# Patient Record
Sex: FEMALE | Race: WHITE | NOT HISPANIC OR LATINO
[De-identification: names, ages, dates, MRNs, and addresses within clinical notes are randomized per-mention and may not be internally consistent; named-entity substitution may affect disease eponyms.]

---

## 2017-03-20 ENCOUNTER — APPOINTMENT (OUTPATIENT)
Dept: OBGYN | Facility: CLINIC | Age: 57
End: 2017-03-20

## 2017-03-20 VITALS
HEIGHT: 68 IN | DIASTOLIC BLOOD PRESSURE: 80 MMHG | BODY MASS INDEX: 32.28 KG/M2 | SYSTOLIC BLOOD PRESSURE: 120 MMHG | WEIGHT: 213 LBS

## 2017-03-20 DIAGNOSIS — Z01.419 ENCOUNTER FOR GYNECOLOGICAL EXAMINATION (GENERAL) (ROUTINE) W/OUT ABNORMAL FINDINGS: ICD-10-CM

## 2017-03-20 DIAGNOSIS — Z80.0 FAMILY HISTORY OF MALIGNANT NEOPLASM OF DIGESTIVE ORGANS: ICD-10-CM

## 2017-03-20 DIAGNOSIS — N93.0 POSTCOITAL AND CONTACT BLEEDING: ICD-10-CM

## 2017-03-23 LAB — CYTOLOGY CVX/VAG DOC THIN PREP: NORMAL

## 2017-05-08 ENCOUNTER — APPOINTMENT (OUTPATIENT)
Dept: ULTRASOUND IMAGING | Facility: CLINIC | Age: 57
End: 2017-05-08

## 2017-05-08 ENCOUNTER — APPOINTMENT (OUTPATIENT)
Dept: RADIOLOGY | Facility: CLINIC | Age: 57
End: 2017-05-08

## 2017-05-08 ENCOUNTER — APPOINTMENT (OUTPATIENT)
Dept: MAMMOGRAPHY | Facility: CLINIC | Age: 57
End: 2017-05-08

## 2017-05-08 ENCOUNTER — OUTPATIENT (OUTPATIENT)
Dept: OUTPATIENT SERVICES | Facility: HOSPITAL | Age: 57
LOS: 1 days | End: 2017-05-08
Payer: COMMERCIAL

## 2017-05-08 DIAGNOSIS — Z00.8 ENCOUNTER FOR OTHER GENERAL EXAMINATION: ICD-10-CM

## 2017-05-08 PROCEDURE — 77063 BREAST TOMOSYNTHESIS BI: CPT

## 2017-05-08 PROCEDURE — 76641 ULTRASOUND BREAST COMPLETE: CPT

## 2017-05-08 PROCEDURE — 77080 DXA BONE DENSITY AXIAL: CPT

## 2017-05-08 PROCEDURE — 76830 TRANSVAGINAL US NON-OB: CPT

## 2017-05-08 PROCEDURE — 77067 SCR MAMMO BI INCL CAD: CPT

## 2017-05-08 PROCEDURE — 76856 US EXAM PELVIC COMPLETE: CPT

## 2017-06-09 ENCOUNTER — APPOINTMENT (OUTPATIENT)
Dept: UROLOGY | Facility: CLINIC | Age: 57
End: 2017-06-09

## 2017-06-09 VITALS
SYSTOLIC BLOOD PRESSURE: 151 MMHG | DIASTOLIC BLOOD PRESSURE: 84 MMHG | HEART RATE: 90 BPM | RESPIRATION RATE: 16 BRPM | TEMPERATURE: 97.7 F

## 2017-06-14 LAB
BACTERIA UR CULT: ABNORMAL
BILIRUB UR QL STRIP: NORMAL
GLUCOSE UR-MCNC: NORMAL
HCG UR QL: 0.2 EU/DL
HGB UR QL STRIP.AUTO: NORMAL
KETONES UR-MCNC: NORMAL
LEUKOCYTE ESTERASE UR QL STRIP: NORMAL
NITRITE UR QL STRIP: NORMAL
PH UR STRIP: 6
PROT UR STRIP-MCNC: NORMAL
SP GR UR STRIP: 1.02

## 2017-06-23 ENCOUNTER — RESULT REVIEW (OUTPATIENT)
Age: 57
End: 2017-06-23

## 2017-06-29 ENCOUNTER — FORM ENCOUNTER (OUTPATIENT)
Age: 57
End: 2017-06-29

## 2017-06-30 ENCOUNTER — APPOINTMENT (OUTPATIENT)
Dept: UROLOGY | Facility: CLINIC | Age: 57
End: 2017-06-30

## 2017-06-30 ENCOUNTER — APPOINTMENT (OUTPATIENT)
Dept: CT IMAGING | Facility: IMAGING CENTER | Age: 57
End: 2017-06-30

## 2017-06-30 ENCOUNTER — OUTPATIENT (OUTPATIENT)
Dept: OUTPATIENT SERVICES | Facility: HOSPITAL | Age: 57
LOS: 1 days | End: 2017-06-30
Payer: COMMERCIAL

## 2017-06-30 VITALS
SYSTOLIC BLOOD PRESSURE: 169 MMHG | DIASTOLIC BLOOD PRESSURE: 92 MMHG | TEMPERATURE: 98 F | WEIGHT: 215 LBS | BODY MASS INDEX: 32.58 KG/M2 | RESPIRATION RATE: 16 BRPM | HEIGHT: 68 IN | HEART RATE: 88 BPM

## 2017-06-30 DIAGNOSIS — R35.0 FREQUENCY OF MICTURITION: ICD-10-CM

## 2017-06-30 DIAGNOSIS — R31.0 GROSS HEMATURIA: ICD-10-CM

## 2017-06-30 PROCEDURE — 52000 CYSTOURETHROSCOPY: CPT

## 2017-06-30 PROCEDURE — 74178 CT ABD&PLV WO CNTR FLWD CNTR: CPT

## 2017-07-03 DIAGNOSIS — R31.0 GROSS HEMATURIA: ICD-10-CM

## 2017-07-06 LAB
ANION GAP SERPL CALC-SCNC: 17 MMOL/L
BUN SERPL-MCNC: 15 MG/DL
CALCIUM SERPL-MCNC: 9.6 MG/DL
CHLORIDE SERPL-SCNC: 102 MMOL/L
CO2 SERPL-SCNC: 24 MMOL/L
CORE LAB FLUID CYTOLOGY: NORMAL
CREAT SERPL-MCNC: 0.86 MG/DL
GLUCOSE SERPL-MCNC: 93 MG/DL
POTASSIUM SERPL-SCNC: 4.2 MMOL/L
SODIUM SERPL-SCNC: 143 MMOL/L

## 2017-12-22 ENCOUNTER — APPOINTMENT (OUTPATIENT)
Dept: UROLOGY | Facility: CLINIC | Age: 57
End: 2017-12-22
Payer: COMMERCIAL

## 2017-12-22 VITALS
TEMPERATURE: 97.7 F | SYSTOLIC BLOOD PRESSURE: 147 MMHG | WEIGHT: 215 LBS | HEIGHT: 68 IN | HEART RATE: 70 BPM | DIASTOLIC BLOOD PRESSURE: 85 MMHG | BODY MASS INDEX: 32.58 KG/M2

## 2017-12-22 DIAGNOSIS — K42.9 UMBILICAL HERNIA W/OUT OBSTRUCTION OR GANGRENE: ICD-10-CM

## 2017-12-22 LAB
APPEARANCE: ABNORMAL
BACTERIA: ABNORMAL
BILIRUBIN URINE: NEGATIVE
BLOOD URINE: NEGATIVE
COLOR: YELLOW
GLUCOSE QUALITATIVE U: NEGATIVE MG/DL
HYALINE CASTS: 3 /LPF
KETONES URINE: NEGATIVE
LEUKOCYTE ESTERASE URINE: NEGATIVE
MICROSCOPIC-UA: NORMAL
NITRITE URINE: NEGATIVE
PH URINE: 7.5
PROTEIN URINE: NEGATIVE MG/DL
RED BLOOD CELLS URINE: 3 /HPF
SPECIFIC GRAVITY URINE: 1.02
SQUAMOUS EPITHELIAL CELLS: 14 /HPF
UROBILINOGEN URINE: NEGATIVE MG/DL
WHITE BLOOD CELLS URINE: 2 /HPF

## 2017-12-22 PROCEDURE — 99213 OFFICE O/P EST LOW 20 MIN: CPT

## 2017-12-29 ENCOUNTER — APPOINTMENT (OUTPATIENT)
Dept: UROLOGY | Facility: CLINIC | Age: 57
End: 2017-12-29

## 2018-01-19 ENCOUNTER — OUTPATIENT (OUTPATIENT)
Dept: OUTPATIENT SERVICES | Facility: HOSPITAL | Age: 58
LOS: 1 days | End: 2018-01-19
Payer: COMMERCIAL

## 2018-01-19 VITALS
SYSTOLIC BLOOD PRESSURE: 130 MMHG | TEMPERATURE: 98 F | RESPIRATION RATE: 16 BRPM | WEIGHT: 205.03 LBS | HEART RATE: 69 BPM | HEIGHT: 67 IN | OXYGEN SATURATION: 97 % | DIASTOLIC BLOOD PRESSURE: 88 MMHG

## 2018-01-19 DIAGNOSIS — Z01.818 ENCOUNTER FOR OTHER PREPROCEDURAL EXAMINATION: ICD-10-CM

## 2018-01-19 DIAGNOSIS — Z98.890 OTHER SPECIFIED POSTPROCEDURAL STATES: Chronic | ICD-10-CM

## 2018-01-19 DIAGNOSIS — G47.30 SLEEP APNEA, UNSPECIFIED: ICD-10-CM

## 2018-01-19 DIAGNOSIS — K42.9 UMBILICAL HERNIA WITHOUT OBSTRUCTION OR GANGRENE: ICD-10-CM

## 2018-01-19 LAB
HCT VFR BLD CALC: 42.2 % — SIGNIFICANT CHANGE UP (ref 34.5–45)
HGB BLD-MCNC: 13.6 G/DL — SIGNIFICANT CHANGE UP (ref 11.5–15.5)
MCHC RBC-ENTMCNC: 29.8 PG — SIGNIFICANT CHANGE UP (ref 27–34)
MCHC RBC-ENTMCNC: 32.2 GM/DL — SIGNIFICANT CHANGE UP (ref 32–36)
MCV RBC AUTO: 92.3 FL — SIGNIFICANT CHANGE UP (ref 80–100)
PLATELET # BLD AUTO: 272 K/UL — SIGNIFICANT CHANGE UP (ref 150–400)
RBC # BLD: 4.57 M/UL — SIGNIFICANT CHANGE UP (ref 3.8–5.2)
RBC # FLD: 14.7 % — HIGH (ref 10.3–14.5)
WBC # BLD: 9.02 K/UL — SIGNIFICANT CHANGE UP (ref 3.8–10.5)
WBC # FLD AUTO: 9.02 K/UL — SIGNIFICANT CHANGE UP (ref 3.8–10.5)

## 2018-01-19 PROCEDURE — 85027 COMPLETE CBC AUTOMATED: CPT

## 2018-01-19 PROCEDURE — G0463: CPT

## 2018-01-19 PROCEDURE — 80048 BASIC METABOLIC PNL TOTAL CA: CPT

## 2018-01-19 RX ORDER — LIDOCAINE HCL 20 MG/ML
0.2 VIAL (ML) INJECTION ONCE
Qty: 0 | Refills: 0 | Status: DISCONTINUED | OUTPATIENT
Start: 2018-01-30 | End: 2018-02-14

## 2018-01-19 RX ORDER — SODIUM CHLORIDE 9 MG/ML
3 INJECTION INTRAMUSCULAR; INTRAVENOUS; SUBCUTANEOUS EVERY 8 HOURS
Qty: 0 | Refills: 0 | Status: DISCONTINUED | OUTPATIENT
Start: 2018-01-30 | End: 2018-02-14

## 2018-01-19 RX ORDER — ACETAMINOPHEN 500 MG
1000 TABLET ORAL ONCE
Qty: 0 | Refills: 0 | Status: COMPLETED | OUTPATIENT
Start: 2018-01-30 | End: 2018-01-30

## 2018-01-19 NOTE — H&P PST ADULT - PROBLEM SELECTOR PLAN 1
Umbilical hernia repair  Pepcid x2 pre-op as directed Umbilical hernia repair possible mesh  Pepcid x2 pre-op as directed

## 2018-01-19 NOTE — H&P PST ADULT - HISTORY OF PRESENT ILLNESS
59 y/o f with sleep apnea, uses c-pap, obesity presents pre umbilical hernia repair scheduled for 1/30/18.

## 2018-01-20 LAB
ANION GAP SERPL CALC-SCNC: 12 MMOL/L — SIGNIFICANT CHANGE UP (ref 5–17)
BUN SERPL-MCNC: 23 MG/DL — SIGNIFICANT CHANGE UP (ref 7–23)
CALCIUM SERPL-MCNC: 10.4 MG/DL — SIGNIFICANT CHANGE UP (ref 8.4–10.5)
CHLORIDE SERPL-SCNC: 101 MMOL/L — SIGNIFICANT CHANGE UP (ref 96–108)
CO2 SERPL-SCNC: 29 MMOL/L — SIGNIFICANT CHANGE UP (ref 22–31)
CREAT SERPL-MCNC: 0.88 MG/DL — SIGNIFICANT CHANGE UP (ref 0.5–1.3)
GLUCOSE SERPL-MCNC: 84 MG/DL — SIGNIFICANT CHANGE UP (ref 70–99)
POTASSIUM SERPL-MCNC: 4.7 MMOL/L — SIGNIFICANT CHANGE UP (ref 3.5–5.3)
POTASSIUM SERPL-SCNC: 4.7 MMOL/L — SIGNIFICANT CHANGE UP (ref 3.5–5.3)
SODIUM SERPL-SCNC: 142 MMOL/L — SIGNIFICANT CHANGE UP (ref 135–145)

## 2018-01-30 ENCOUNTER — OUTPATIENT (OUTPATIENT)
Dept: OUTPATIENT SERVICES | Facility: HOSPITAL | Age: 58
LOS: 1 days | End: 2018-01-30
Payer: COMMERCIAL

## 2018-01-30 ENCOUNTER — RESULT REVIEW (OUTPATIENT)
Age: 58
End: 2018-01-30

## 2018-01-30 ENCOUNTER — TRANSCRIPTION ENCOUNTER (OUTPATIENT)
Age: 58
End: 2018-01-30

## 2018-01-30 VITALS
OXYGEN SATURATION: 98 % | TEMPERATURE: 98 F | DIASTOLIC BLOOD PRESSURE: 63 MMHG | RESPIRATION RATE: 20 BRPM | SYSTOLIC BLOOD PRESSURE: 107 MMHG | HEART RATE: 59 BPM

## 2018-01-30 VITALS
DIASTOLIC BLOOD PRESSURE: 78 MMHG | HEIGHT: 67 IN | SYSTOLIC BLOOD PRESSURE: 125 MMHG | RESPIRATION RATE: 18 BRPM | HEART RATE: 63 BPM | TEMPERATURE: 98 F | WEIGHT: 205.03 LBS

## 2018-01-30 DIAGNOSIS — Z98.890 OTHER SPECIFIED POSTPROCEDURAL STATES: Chronic | ICD-10-CM

## 2018-01-30 DIAGNOSIS — K42.9 UMBILICAL HERNIA WITHOUT OBSTRUCTION OR GANGRENE: ICD-10-CM

## 2018-01-30 PROCEDURE — 88302 TISSUE EXAM BY PATHOLOGIST: CPT | Mod: 26

## 2018-01-30 PROCEDURE — 49560: CPT

## 2018-01-30 PROCEDURE — 88302 TISSUE EXAM BY PATHOLOGIST: CPT

## 2018-01-30 RX ORDER — SODIUM CHLORIDE 9 MG/ML
1000 INJECTION, SOLUTION INTRAVENOUS
Qty: 0 | Refills: 0 | Status: DISCONTINUED | OUTPATIENT
Start: 2018-01-30 | End: 2018-02-14

## 2018-01-30 RX ORDER — HYDROMORPHONE HYDROCHLORIDE 2 MG/ML
0.25 INJECTION INTRAMUSCULAR; INTRAVENOUS; SUBCUTANEOUS
Qty: 0 | Refills: 0 | Status: DISCONTINUED | OUTPATIENT
Start: 2018-01-30 | End: 2018-01-30

## 2018-01-30 RX ORDER — CELECOXIB 200 MG/1
200 CAPSULE ORAL ONCE
Qty: 0 | Refills: 0 | Status: COMPLETED | OUTPATIENT
Start: 2018-01-30 | End: 2018-01-30

## 2018-01-30 RX ORDER — OXYCODONE HYDROCHLORIDE 5 MG/1
5 TABLET ORAL ONCE
Qty: 0 | Refills: 0 | Status: DISCONTINUED | OUTPATIENT
Start: 2018-01-30 | End: 2018-01-30

## 2018-01-30 RX ORDER — ONDANSETRON 8 MG/1
4 TABLET, FILM COATED ORAL ONCE
Qty: 0 | Refills: 0 | Status: COMPLETED | OUTPATIENT
Start: 2018-01-30 | End: 2018-01-30

## 2018-01-30 RX ADMIN — CELECOXIB 200 MILLIGRAM(S): 200 CAPSULE ORAL at 13:15

## 2018-01-30 RX ADMIN — CELECOXIB 200 MILLIGRAM(S): 200 CAPSULE ORAL at 12:42

## 2018-01-30 RX ADMIN — OXYCODONE HYDROCHLORIDE 5 MILLIGRAM(S): 5 TABLET ORAL at 13:15

## 2018-01-30 RX ADMIN — OXYCODONE HYDROCHLORIDE 5 MILLIGRAM(S): 5 TABLET ORAL at 12:42

## 2018-01-30 RX ADMIN — ONDANSETRON 4 MILLIGRAM(S): 8 TABLET, FILM COATED ORAL at 12:42

## 2018-01-30 RX ADMIN — Medication 1000 MILLIGRAM(S): at 09:21

## 2018-01-30 RX ADMIN — CELECOXIB 200 MILLIGRAM(S): 200 CAPSULE ORAL at 09:21

## 2018-01-30 NOTE — ASU DISCHARGE PLAN (ADULT/PEDIATRIC). - SPECIAL INSTRUCTIONS
WOUND CARE: Steri strips will fall off in 7-14 days   BATHING: Please do not submerge wound underwater. You may shower and/or sponge bathe.  ACTIVITY: No heavy lifting anything more than 10-15lbs or straining. Otherwise, you may return to your usual level of physical activity. If you are taking narcotic pain medication (such as Percocet), do NOT drive a car, operate machinery or make important decisions.  DIET: Regular diet  NOTIFY YOUR SURGEON IF: You have any bleeding that does not stop, any pus draining from your wound, any fever (over 100.4 F) or chills, persistent nausea/vomiting with inability to tolerate food or liquids, persistent diarrhea, or if your pain is not controlled on your discharge pain medications.  FOLLOW-UP:  1. Please call to make a 1-2 week follow-up appointment with Dr. EMILIA Aguilar within one week of discharge   2. Please follow up with your primary care physician in one week regarding your procedure.

## 2018-01-30 NOTE — ASU DISCHARGE PLAN (ADULT/PEDIATRIC). - NURSING INSTRUCTIONS
call if not void in 8 hours , for excessive bleeding, pain ,swelling or fever greater than 101. Ice pack 20min on 20 min off for 48 hours. call if not void in 8 hours , for excessive bleeding, pain ,swelling or fever greater than 101. Ice pack 20min on 20 min off for 48 hours. No strenuous  activity.

## 2018-01-30 NOTE — ASU DISCHARGE PLAN (ADULT/PEDIATRIC). - MEDICATION SUMMARY - MEDICATIONS TO TAKE
I will START or STAY ON the medications listed below when I get home from the hospital:    oxyCODONE-acetaminophen 5 mg-325 mg oral tablet  -- 1 tab(s) by mouth every 4 to 6 hours, As Needed MDD:6 doses   -- Caution federal law prohibits the transfer of this drug to any person other  than the person for whom it was prescribed.  May cause drowsiness.  Alcohol may intensify this effect.  Use care when operating dangerous machinery.  This prescription cannot be refilled.  This product contains acetaminophen.  Do not use  with any other product containing acetaminophen to prevent possible liver damage.  Using more of this medication than prescribed may cause serious breathing problems.    -- Indication: For post op pain

## 2018-02-02 LAB — SURGICAL PATHOLOGY STUDY: SIGNIFICANT CHANGE UP

## 2018-07-25 PROBLEM — Z80.0 FAMILY HISTORY OF COLON CANCER: Status: ACTIVE | Noted: 2017-03-20

## 2018-09-17 ENCOUNTER — APPOINTMENT (OUTPATIENT)
Dept: PULMONOLOGY | Facility: CLINIC | Age: 58
End: 2018-09-17
Payer: COMMERCIAL

## 2018-09-17 VITALS
OXYGEN SATURATION: 95 % | HEIGHT: 68 IN | HEART RATE: 76 BPM | DIASTOLIC BLOOD PRESSURE: 83 MMHG | BODY MASS INDEX: 30.31 KG/M2 | RESPIRATION RATE: 16 BRPM | WEIGHT: 200 LBS | SYSTOLIC BLOOD PRESSURE: 129 MMHG

## 2018-09-17 PROBLEM — K42.9 UMBILICAL HERNIA WITHOUT OBSTRUCTION OR GANGRENE: Chronic | Status: ACTIVE | Noted: 2018-01-19

## 2018-09-17 PROBLEM — G47.30 SLEEP APNEA, UNSPECIFIED: Chronic | Status: ACTIVE | Noted: 2018-01-19

## 2018-09-17 PROCEDURE — 99213 OFFICE O/P EST LOW 20 MIN: CPT

## 2018-12-13 ENCOUNTER — APPOINTMENT (OUTPATIENT)
Dept: PULMONOLOGY | Facility: CLINIC | Age: 58
End: 2018-12-13
Payer: COMMERCIAL

## 2018-12-13 VITALS
DIASTOLIC BLOOD PRESSURE: 82 MMHG | SYSTOLIC BLOOD PRESSURE: 130 MMHG | OXYGEN SATURATION: 97 % | RESPIRATION RATE: 16 BRPM | TEMPERATURE: 97.8 F | HEART RATE: 71 BPM

## 2018-12-13 DIAGNOSIS — G47.00 INSOMNIA, UNSPECIFIED: ICD-10-CM

## 2018-12-13 PROCEDURE — 99213 OFFICE O/P EST LOW 20 MIN: CPT

## 2019-01-10 ENCOUNTER — RECORD ABSTRACTING (OUTPATIENT)
Age: 59
End: 2019-01-10

## 2019-01-10 DIAGNOSIS — K43.9 VENTRAL HERNIA W/OUT OBSTRUCTION OR GANGRENE: ICD-10-CM

## 2019-01-10 DIAGNOSIS — Z83.3 FAMILY HISTORY OF DIABETES MELLITUS: ICD-10-CM

## 2019-01-16 ENCOUNTER — OUTPATIENT (OUTPATIENT)
Dept: OUTPATIENT SERVICES | Facility: HOSPITAL | Age: 59
LOS: 1 days | Discharge: HOME | End: 2019-01-16
Payer: COMMERCIAL

## 2019-01-16 VITALS
TEMPERATURE: 98 F | SYSTOLIC BLOOD PRESSURE: 130 MMHG | WEIGHT: 211.42 LBS | RESPIRATION RATE: 17 BRPM | HEART RATE: 68 BPM | DIASTOLIC BLOOD PRESSURE: 86 MMHG | HEIGHT: 68 IN | OXYGEN SATURATION: 97 %

## 2019-01-16 VITALS
HEART RATE: 68 BPM | TEMPERATURE: 98 F | RESPIRATION RATE: 16 BRPM | DIASTOLIC BLOOD PRESSURE: 86 MMHG | SYSTOLIC BLOOD PRESSURE: 130 MMHG | OXYGEN SATURATION: 97 % | HEIGHT: 68 IN | WEIGHT: 211.42 LBS

## 2019-01-16 DIAGNOSIS — Z01.818 ENCOUNTER FOR OTHER PREPROCEDURAL EXAMINATION: ICD-10-CM

## 2019-01-16 DIAGNOSIS — Z90.722 ACQUIRED ABSENCE OF OVARIES, BILATERAL: Chronic | ICD-10-CM

## 2019-01-16 DIAGNOSIS — Z98.890 OTHER SPECIFIED POSTPROCEDURAL STATES: Chronic | ICD-10-CM

## 2019-01-16 DIAGNOSIS — M17.11 UNILATERAL PRIMARY OSTEOARTHRITIS, RIGHT KNEE: ICD-10-CM

## 2019-01-16 DIAGNOSIS — Z98.51 TUBAL LIGATION STATUS: Chronic | ICD-10-CM

## 2019-01-16 LAB
ALBUMIN SERPL ELPH-MCNC: 3.6 G/DL — SIGNIFICANT CHANGE UP (ref 3.3–5)
ALP SERPL-CCNC: 78 U/L — SIGNIFICANT CHANGE UP (ref 40–120)
ALT FLD-CCNC: 23 U/L — SIGNIFICANT CHANGE UP (ref 12–78)
ANION GAP SERPL CALC-SCNC: 8 MMOL/L — SIGNIFICANT CHANGE UP (ref 5–17)
APTT BLD: 31.7 SEC — SIGNIFICANT CHANGE UP (ref 28.5–37)
AST SERPL-CCNC: 10 U/L — LOW (ref 15–37)
BILIRUB SERPL-MCNC: 0.5 MG/DL — SIGNIFICANT CHANGE UP (ref 0.2–1.2)
BUN SERPL-MCNC: 18 MG/DL — SIGNIFICANT CHANGE UP (ref 7–23)
CALCIUM SERPL-MCNC: 9.4 MG/DL — SIGNIFICANT CHANGE UP (ref 8.5–10.1)
CHLORIDE SERPL-SCNC: 108 MMOL/L — SIGNIFICANT CHANGE UP (ref 96–108)
CO2 SERPL-SCNC: 28 MMOL/L — SIGNIFICANT CHANGE UP (ref 22–31)
CREAT SERPL-MCNC: 0.89 MG/DL — SIGNIFICANT CHANGE UP (ref 0.5–1.3)
GLUCOSE SERPL-MCNC: 92 MG/DL — SIGNIFICANT CHANGE UP (ref 70–99)
HBA1C BLD-MCNC: 5.8 % — HIGH (ref 4–5.6)
HCT VFR BLD CALC: 44.1 % — SIGNIFICANT CHANGE UP (ref 34.5–45)
HCV AB S/CO SERPL IA: 0.17 S/CO — SIGNIFICANT CHANGE UP
HCV AB SERPL-IMP: SIGNIFICANT CHANGE UP
HGB BLD-MCNC: 14.1 G/DL — SIGNIFICANT CHANGE UP (ref 11.5–15.5)
HIV 1 & 2 AB SERPL IA.RAPID: SIGNIFICANT CHANGE UP
INR BLD: 1.01 RATIO — SIGNIFICANT CHANGE UP (ref 0.88–1.16)
MCHC RBC-ENTMCNC: 29.3 PG — SIGNIFICANT CHANGE UP (ref 27–34)
MCHC RBC-ENTMCNC: 32 GM/DL — SIGNIFICANT CHANGE UP (ref 32–36)
MCV RBC AUTO: 91.5 FL — SIGNIFICANT CHANGE UP (ref 80–100)
MRSA PCR RESULT.: SIGNIFICANT CHANGE UP
NRBC # BLD: 0 /100 WBCS — SIGNIFICANT CHANGE UP (ref 0–0)
PLATELET # BLD AUTO: 238 K/UL — SIGNIFICANT CHANGE UP (ref 150–400)
POTASSIUM SERPL-MCNC: 4.3 MMOL/L — SIGNIFICANT CHANGE UP (ref 3.5–5.3)
POTASSIUM SERPL-SCNC: 4.3 MMOL/L — SIGNIFICANT CHANGE UP (ref 3.5–5.3)
PROT SERPL-MCNC: 7.5 GM/DL — SIGNIFICANT CHANGE UP (ref 6–8.3)
PROTHROM AB SERPL-ACNC: 11.3 SEC — SIGNIFICANT CHANGE UP (ref 10–12.9)
RBC # BLD: 4.82 M/UL — SIGNIFICANT CHANGE UP (ref 3.8–5.2)
RBC # FLD: 14.4 % — SIGNIFICANT CHANGE UP (ref 10.3–14.5)
S AUREUS DNA NOSE QL NAA+PROBE: SIGNIFICANT CHANGE UP
SODIUM SERPL-SCNC: 144 MMOL/L — SIGNIFICANT CHANGE UP (ref 135–145)
WBC # BLD: 5.52 K/UL — SIGNIFICANT CHANGE UP (ref 3.8–10.5)
WBC # FLD AUTO: 5.52 K/UL — SIGNIFICANT CHANGE UP (ref 3.8–10.5)

## 2019-01-16 PROCEDURE — 93010 ELECTROCARDIOGRAM REPORT: CPT | Mod: NC

## 2019-01-16 NOTE — H&P PST ADULT - ASSESSMENT
60 yo female with OA of the right knee scheduled for a right total knee arthroplasty on  with Dr. Germano CAPRINI SCORE [CLOT]    AGE RELATED RISK FACTORS                                                       MOBILITY RELATED FACTORS  [x ] Age 41-60 years                                            (1 Point)                  [ ] Bed rest                                                        (1 Point)  [ ] Age: 61-74 years                                           (2 Points)                 [ ] Plaster cast                                                   (2 Points)  [ ] Age= 75 years                                              (3 Points)                 [ ] Bed bound for more than 72 hours                 (2 Points)    DISEASE RELATED RISK FACTORS                                               GENDER SPECIFIC FACTORS  [ ] Edema in the lower extremities                       (1 Point)                  [ ] Pregnancy                                                     (1 Point)  [ ] Varicose veins                                               (1 Point)                  [ ] Post-partum < 6 weeks                                   (1 Point)             [ x] BMI > 25 Kg/m2                                            (1 Point)                  [ ] Hormonal therapy  or oral contraception          (1 Point)                 [ ] Sepsis (in the previous month)                        (1 Point)                  [ ] History of pregnancy complications                 (1 point)  [ ] Pneumonia or serious lung disease                                               [ ] Unexplained or recurrent                     (1 Point)           (in the previous month)                               (1 Point)  [ ] Abnormal pulmonary function test                     (1 Point)                 SURGERY RELATED RISK FACTORS  [ ] Acute myocardial infarction                              (1 Point)                 [ ]  Section                                             (1 Point)  [ ] Congestive heart failure (in the previous month)  (1 Point)               [ ] Minor surgery                                                  (1 Point)   [ ] Inflammatory bowel disease                             (1 Point)                 [ ] Arthroscopic surgery                                        (2 Points)  [ ] Central venous access                                      (2 Points)                [ ] General surgery lasting more than 45 minutes   (2 Points)       [ ] Stroke (in the previous month)                          (5 Points)               [x ] Elective arthroplasty                                         (5 Points)                                                                                                                                               HEMATOLOGY RELATED FACTORS                                                 TRAUMA RELATED RISK FACTORS  [ ] Prior episodes of VTE                                     (3 Points)                [ ] Fracture of the hip, pelvis, or leg                       (5 Points)  [x ] Positive family history for VTE                         (3 Points)                 [ ] Acute spinal cord injury (in the previous month)  (5 Points)  [ ] Prothrombin 43132 A                                     (3 Points)                 [ ] Paralysis  (less than 1 month)                             (5 Points)  [ ] Factor V Leiden                                             (3 Points)                  [ ] Multiple Trauma within 1 month                        (5 Points)  [ ] Lupus anticoagulants                                     (3 Points)                                                           [ ] Anticardiolipin antibodies                               (3 Points)                                                       [ ] High homocysteine in the blood                      (3 Points)                                             [ ] Other congenital or acquired thrombophilia      (3 Points)                                                [ ] Heparin induced thrombocytopenia                  (3 Points)                                          Total Score [     10     ]

## 2019-01-16 NOTE — H&P PST ADULT - PSH
H/O bilateral oophorectomy  age 41  H/O sinus surgery    H/O tubal ligation  1986  H/O ventral hernia repair  2018  S/P left knee arthroscopy  x 3 H/O bilateral oophorectomy  age 41  H/O sinus surgery    H/O tubal ligation  1986  H/O ventral hernia repair  2018  S/P arthroscopic surgery of right knee  x 3

## 2019-01-16 NOTE — H&P PST ADULT - PMH
Sleep apnea    Umbilical hernia without obstruction and without gangrene Sleep apnea    Umbilical hernia without obstruction and without gangrene    Unilateral primary osteoarthritis, right knee

## 2019-01-16 NOTE — H&P PST ADULT - PROBLEM SELECTOR PLAN 2
Labs-CBC, CMP, PT/PTT, T&S, A1c, Nose cx, and EKG  MC with Dr. London  Pre op and 3 day of Hibiclens instructions reviewed and given. Hold Aleve. Avoid NSAIDs and OTC supplements. Verbalized understanding

## 2019-01-16 NOTE — H&P PST ADULT - FAMILY HISTORY
Mother  Still living? No  Family history of ovarian cancer, Age at diagnosis: Age Unknown     Sibling  Still living? Yes, Estimated age: Age Unknown  Family history of ovarian cancer, Age at diagnosis: Age Unknown

## 2019-01-16 NOTE — PHYSICAL THERAPY INITIAL EVALUATION ADULT - ADDITIONAL COMMENTS
Pt is right handed and wears separate glasses for distance and for reading.  Patient drives.  Lives in private home with 3-4 steps and bilateral rails to enter.  Reports bedroom and bathroom all on one level after entering.

## 2019-01-16 NOTE — H&P PST ADULT - HISTORY OF PRESENT ILLNESS
58 yo female with VIVI on CPAP, presents to PST scheduled for a right total knee arthroplasty on 1/30 with Dr. Goodman. Reposts pain and limited ROM. Pain is a 7/10 today

## 2019-01-17 ENCOUNTER — APPOINTMENT (OUTPATIENT)
Dept: PULMONOLOGY | Facility: CLINIC | Age: 59
End: 2019-01-17

## 2019-01-21 ENCOUNTER — APPOINTMENT (OUTPATIENT)
Dept: INTERNAL MEDICINE | Facility: CLINIC | Age: 59
End: 2019-01-21
Payer: COMMERCIAL

## 2019-01-21 VITALS
HEART RATE: 70 BPM | WEIGHT: 213 LBS | DIASTOLIC BLOOD PRESSURE: 70 MMHG | BODY MASS INDEX: 32.28 KG/M2 | HEIGHT: 68 IN | SYSTOLIC BLOOD PRESSURE: 130 MMHG

## 2019-01-21 DIAGNOSIS — M23.91 UNSPECIFIED INTERNAL DERANGEMENT OF RIGHT KNEE: ICD-10-CM

## 2019-01-21 DIAGNOSIS — G47.33 OBSTRUCTIVE SLEEP APNEA (ADULT) (PEDIATRIC): ICD-10-CM

## 2019-01-21 PROCEDURE — 99214 OFFICE O/P EST MOD 30 MIN: CPT

## 2019-01-21 NOTE — RESULTS/DATA
[] : results reviewed [de-identified] : acceptable [de-identified] : acceptable [de-identified] : acceptable [de-identified] : RBBB   unchanged from prior (2017)   T wave abnormality

## 2019-01-21 NOTE — CONSULT LETTER
[Dear  ___] : Dear  [unfilled], [Consult Letter:] : I had the pleasure of evaluating your patient, [unfilled]. [Please see my note below.] : Please see my note below. [Consult Closing:] : Thank you very much for allowing me to participate in the care of this patient.  If you have any questions, please do not hesitate to contact me. [FreeTextEntry2] : Altaf Goodman MD\michael Balbuena

## 2019-01-21 NOTE — ASSESSMENT
[Patient Optimized for Surgery] : Patient optimized for surgery [As per surgery] : as per surgery [FreeTextEntry4] : Cardiovascular situation is stable and there are no apparent impediments to proceeding with the planned surgical procedure.  She will continue using her CPAP in the perioperative period.

## 2019-01-21 NOTE — HISTORY OF PRESENT ILLNESS
[Sleep Apnea] : sleep apnea [No Adverse Anesthesia Reaction] : no adverse anesthesia reaction in self or family member [(Patient denies any chest pain, claudication, dyspnea on exertion, orthopnea, palpitations or syncope)] : Patient denies any chest pain, claudication, dyspnea on exertion, orthopnea, palpitations or syncope [Chronic Anticoagulation] : no chronic anticoagulation [Chronic Kidney Disease] : no chronic kidney disease [Diabetes] : no diabetes [FreeTextEntry1] : right TKR [FreeTextEntry2] : Wednesday January 30., 2019   to be done at [facility] [FreeTextEntry3] : Altaf Goodman MD [FreeTextEntry4] : The patient is 59 years old.  She has had chronic disabling pain in the right knee and has been under the care of Dr. Peñaloza our wound has previously done an arthroscopic procedures.  Her functional capacity is limited by pain.

## 2019-01-29 ENCOUNTER — TRANSCRIPTION ENCOUNTER (OUTPATIENT)
Age: 59
End: 2019-01-29

## 2019-01-30 ENCOUNTER — INPATIENT (INPATIENT)
Facility: HOSPITAL | Age: 59
LOS: 0 days | Discharge: HOME HEALTH | End: 2019-01-31
Attending: ORTHOPAEDIC SURGERY | Admitting: ORTHOPAEDIC SURGERY
Payer: COMMERCIAL

## 2019-01-30 ENCOUNTER — TRANSCRIPTION ENCOUNTER (OUTPATIENT)
Age: 59
End: 2019-01-30

## 2019-01-30 ENCOUNTER — RESULT REVIEW (OUTPATIENT)
Age: 59
End: 2019-01-30

## 2019-01-30 VITALS
HEIGHT: 68 IN | WEIGHT: 218.04 LBS | OXYGEN SATURATION: 97 % | TEMPERATURE: 97 F | HEART RATE: 72 BPM | RESPIRATION RATE: 18 BRPM | SYSTOLIC BLOOD PRESSURE: 126 MMHG | DIASTOLIC BLOOD PRESSURE: 71 MMHG

## 2019-01-30 DIAGNOSIS — Z90.722 ACQUIRED ABSENCE OF OVARIES, BILATERAL: Chronic | ICD-10-CM

## 2019-01-30 DIAGNOSIS — Z98.51 TUBAL LIGATION STATUS: Chronic | ICD-10-CM

## 2019-01-30 DIAGNOSIS — Z98.890 OTHER SPECIFIED POSTPROCEDURAL STATES: Chronic | ICD-10-CM

## 2019-01-30 LAB
ANION GAP SERPL CALC-SCNC: 7 MMOL/L — SIGNIFICANT CHANGE UP (ref 5–17)
BUN SERPL-MCNC: 18 MG/DL — SIGNIFICANT CHANGE UP (ref 7–23)
CALCIUM SERPL-MCNC: 8.4 MG/DL — LOW (ref 8.5–10.1)
CHLORIDE SERPL-SCNC: 112 MMOL/L — HIGH (ref 96–108)
CO2 SERPL-SCNC: 25 MMOL/L — SIGNIFICANT CHANGE UP (ref 22–31)
CREAT SERPL-MCNC: 0.86 MG/DL — SIGNIFICANT CHANGE UP (ref 0.5–1.3)
GLUCOSE SERPL-MCNC: 98 MG/DL — SIGNIFICANT CHANGE UP (ref 70–99)
HCT VFR BLD CALC: 39.2 % — SIGNIFICANT CHANGE UP (ref 34.5–45)
HGB BLD-MCNC: 12.7 G/DL — SIGNIFICANT CHANGE UP (ref 11.5–15.5)
MCHC RBC-ENTMCNC: 29.7 PG — SIGNIFICANT CHANGE UP (ref 27–34)
MCHC RBC-ENTMCNC: 32.4 GM/DL — SIGNIFICANT CHANGE UP (ref 32–36)
MCV RBC AUTO: 91.6 FL — SIGNIFICANT CHANGE UP (ref 80–100)
NRBC # BLD: 0 /100 WBCS — SIGNIFICANT CHANGE UP (ref 0–0)
PLATELET # BLD AUTO: 224 K/UL — SIGNIFICANT CHANGE UP (ref 150–400)
POTASSIUM SERPL-MCNC: 4.3 MMOL/L — SIGNIFICANT CHANGE UP (ref 3.5–5.3)
POTASSIUM SERPL-SCNC: 4.3 MMOL/L — SIGNIFICANT CHANGE UP (ref 3.5–5.3)
RBC # BLD: 4.28 M/UL — SIGNIFICANT CHANGE UP (ref 3.8–5.2)
RBC # FLD: 14.4 % — SIGNIFICANT CHANGE UP (ref 10.3–14.5)
SODIUM SERPL-SCNC: 144 MMOL/L — SIGNIFICANT CHANGE UP (ref 135–145)
WBC # BLD: 10.32 K/UL — SIGNIFICANT CHANGE UP (ref 3.8–10.5)
WBC # FLD AUTO: 10.32 K/UL — SIGNIFICANT CHANGE UP (ref 3.8–10.5)

## 2019-01-30 PROCEDURE — 73560 X-RAY EXAM OF KNEE 1 OR 2: CPT | Mod: 26,RT

## 2019-01-30 PROCEDURE — 88305 TISSUE EXAM BY PATHOLOGIST: CPT | Mod: 26

## 2019-01-30 PROCEDURE — 88311 DECALCIFY TISSUE: CPT | Mod: 26

## 2019-01-30 RX ORDER — ACETAMINOPHEN 500 MG
975 TABLET ORAL EVERY 8 HOURS
Qty: 0 | Refills: 0 | Status: DISCONTINUED | OUTPATIENT
Start: 2019-01-30 | End: 2019-01-31

## 2019-01-30 RX ORDER — ZOLPIDEM TARTRATE 10 MG/1
5 TABLET ORAL AT BEDTIME
Qty: 0 | Refills: 0 | Status: DISCONTINUED | OUTPATIENT
Start: 2019-01-30 | End: 2019-01-31

## 2019-01-30 RX ORDER — OXYCODONE HYDROCHLORIDE 5 MG/1
20 TABLET ORAL ONCE
Qty: 0 | Refills: 0 | Status: DISCONTINUED | OUTPATIENT
Start: 2019-01-30 | End: 2019-01-30

## 2019-01-30 RX ORDER — OXYCODONE HYDROCHLORIDE 5 MG/1
10 TABLET ORAL EVERY 4 HOURS
Qty: 0 | Refills: 0 | Status: DISCONTINUED | OUTPATIENT
Start: 2019-01-30 | End: 2019-01-31

## 2019-01-30 RX ORDER — TRANEXAMIC ACID 100 MG/ML
1000 INJECTION, SOLUTION INTRAVENOUS ONCE
Qty: 0 | Refills: 0 | Status: COMPLETED | OUTPATIENT
Start: 2019-01-30 | End: 2019-01-30

## 2019-01-30 RX ORDER — MORPHINE SULFATE 50 MG/1
4 CAPSULE, EXTENDED RELEASE ORAL
Qty: 0 | Refills: 0 | Status: DISCONTINUED | OUTPATIENT
Start: 2019-01-30 | End: 2019-01-30

## 2019-01-30 RX ORDER — ACETAMINOPHEN 500 MG
1000 TABLET ORAL ONCE
Qty: 0 | Refills: 0 | Status: DISCONTINUED | OUTPATIENT
Start: 2019-01-30 | End: 2019-01-31

## 2019-01-30 RX ORDER — ONDANSETRON 8 MG/1
4 TABLET, FILM COATED ORAL ONCE
Qty: 0 | Refills: 0 | Status: DISCONTINUED | OUTPATIENT
Start: 2019-01-30 | End: 2019-01-30

## 2019-01-30 RX ORDER — OXYCODONE HYDROCHLORIDE 5 MG/1
5 TABLET ORAL EVERY 4 HOURS
Qty: 0 | Refills: 0 | Status: DISCONTINUED | OUTPATIENT
Start: 2019-01-30 | End: 2019-01-31

## 2019-01-30 RX ORDER — SODIUM CHLORIDE 9 MG/ML
1000 INJECTION, SOLUTION INTRAVENOUS
Qty: 0 | Refills: 0 | Status: DISCONTINUED | OUTPATIENT
Start: 2019-01-30 | End: 2019-01-31

## 2019-01-30 RX ORDER — HYDROMORPHONE HYDROCHLORIDE 2 MG/ML
1 INJECTION INTRAMUSCULAR; INTRAVENOUS; SUBCUTANEOUS EVERY 4 HOURS
Qty: 0 | Refills: 0 | Status: DISCONTINUED | OUTPATIENT
Start: 2019-01-30 | End: 2019-01-31

## 2019-01-30 RX ORDER — DIPHENHYDRAMINE HCL 50 MG
25 CAPSULE ORAL AT BEDTIME
Qty: 0 | Refills: 0 | Status: DISCONTINUED | OUTPATIENT
Start: 2019-01-30 | End: 2019-01-30

## 2019-01-30 RX ORDER — ACETAMINOPHEN 500 MG
1000 TABLET ORAL ONCE
Qty: 0 | Refills: 0 | Status: COMPLETED | OUTPATIENT
Start: 2019-01-30 | End: 2019-01-30

## 2019-01-30 RX ORDER — SODIUM CHLORIDE 9 MG/ML
1000 INJECTION, SOLUTION INTRAVENOUS
Qty: 0 | Refills: 0 | Status: DISCONTINUED | OUTPATIENT
Start: 2019-01-30 | End: 2019-01-30

## 2019-01-30 RX ORDER — CEFAZOLIN SODIUM 1 G
2000 VIAL (EA) INJECTION EVERY 8 HOURS
Qty: 0 | Refills: 0 | Status: COMPLETED | OUTPATIENT
Start: 2019-01-30 | End: 2019-01-31

## 2019-01-30 RX ORDER — FERROUS SULFATE 325(65) MG
325 TABLET ORAL
Qty: 0 | Refills: 0 | Status: DISCONTINUED | OUTPATIENT
Start: 2019-01-30 | End: 2019-01-31

## 2019-01-30 RX ORDER — ONDANSETRON 8 MG/1
4 TABLET, FILM COATED ORAL EVERY 6 HOURS
Qty: 0 | Refills: 0 | Status: DISCONTINUED | OUTPATIENT
Start: 2019-01-30 | End: 2019-01-31

## 2019-01-30 RX ORDER — DIPHENHYDRAMINE HCL 50 MG
25 CAPSULE ORAL EVERY 6 HOURS
Qty: 0 | Refills: 0 | Status: DISCONTINUED | OUTPATIENT
Start: 2019-01-30 | End: 2019-01-31

## 2019-01-30 RX ORDER — PANTOPRAZOLE SODIUM 20 MG/1
40 TABLET, DELAYED RELEASE ORAL
Qty: 0 | Refills: 0 | Status: DISCONTINUED | OUTPATIENT
Start: 2019-01-30 | End: 2019-01-31

## 2019-01-30 RX ORDER — MAGNESIUM HYDROXIDE 400 MG/1
30 TABLET, CHEWABLE ORAL DAILY
Qty: 0 | Refills: 0 | Status: DISCONTINUED | OUTPATIENT
Start: 2019-01-30 | End: 2019-01-31

## 2019-01-30 RX ORDER — CELECOXIB 200 MG/1
200 CAPSULE ORAL ONCE
Qty: 0 | Refills: 0 | Status: COMPLETED | OUTPATIENT
Start: 2019-01-30 | End: 2019-01-30

## 2019-01-30 RX ORDER — ASCORBIC ACID 60 MG
500 TABLET,CHEWABLE ORAL
Qty: 0 | Refills: 0 | Status: DISCONTINUED | OUTPATIENT
Start: 2019-01-30 | End: 2019-01-31

## 2019-01-30 RX ORDER — ZOLPIDEM TARTRATE 10 MG/1
5 TABLET ORAL AT BEDTIME
Qty: 0 | Refills: 0 | Status: DISCONTINUED | OUTPATIENT
Start: 2019-01-30 | End: 2019-01-30

## 2019-01-30 RX ORDER — FOLIC ACID 0.8 MG
1 TABLET ORAL DAILY
Qty: 0 | Refills: 0 | Status: DISCONTINUED | OUTPATIENT
Start: 2019-01-30 | End: 2019-01-31

## 2019-01-30 RX ORDER — ACETAMINOPHEN 500 MG
650 TABLET ORAL ONCE
Qty: 0 | Refills: 0 | Status: COMPLETED | OUTPATIENT
Start: 2019-01-30 | End: 2019-01-30

## 2019-01-30 RX ORDER — CELECOXIB 200 MG/1
200 CAPSULE ORAL
Qty: 0 | Refills: 0 | Status: DISCONTINUED | OUTPATIENT
Start: 2019-01-31 | End: 2019-01-31

## 2019-01-30 RX ORDER — SODIUM CHLORIDE 9 MG/ML
3 INJECTION INTRAMUSCULAR; INTRAVENOUS; SUBCUTANEOUS EVERY 8 HOURS
Qty: 0 | Refills: 0 | Status: DISCONTINUED | OUTPATIENT
Start: 2019-01-30 | End: 2019-01-30

## 2019-01-30 RX ORDER — SENNA PLUS 8.6 MG/1
2 TABLET ORAL AT BEDTIME
Qty: 0 | Refills: 0 | Status: DISCONTINUED | OUTPATIENT
Start: 2019-01-30 | End: 2019-01-31

## 2019-01-30 RX ORDER — DOCUSATE SODIUM 100 MG
100 CAPSULE ORAL THREE TIMES A DAY
Qty: 0 | Refills: 0 | Status: DISCONTINUED | OUTPATIENT
Start: 2019-01-30 | End: 2019-01-31

## 2019-01-30 RX ORDER — ASPIRIN/CALCIUM CARB/MAGNESIUM 324 MG
325 TABLET ORAL
Qty: 0 | Refills: 0 | Status: DISCONTINUED | OUTPATIENT
Start: 2019-01-31 | End: 2019-01-31

## 2019-01-30 RX ORDER — POLYETHYLENE GLYCOL 3350 17 G/17G
17 POWDER, FOR SOLUTION ORAL DAILY
Qty: 0 | Refills: 0 | Status: DISCONTINUED | OUTPATIENT
Start: 2019-01-30 | End: 2019-01-31

## 2019-01-30 RX ORDER — DEXAMETHASONE 0.5 MG/5ML
10 ELIXIR ORAL ONCE
Qty: 0 | Refills: 0 | Status: COMPLETED | OUTPATIENT
Start: 2019-01-31 | End: 2019-01-31

## 2019-01-30 RX ADMIN — Medication 100 MILLIGRAM(S): at 22:00

## 2019-01-30 RX ADMIN — TRANEXAMIC ACID 220 MILLIGRAM(S): 100 INJECTION, SOLUTION INTRAVENOUS at 12:36

## 2019-01-30 RX ADMIN — Medication 500 MILLIGRAM(S): at 17:59

## 2019-01-30 RX ADMIN — CELECOXIB 200 MILLIGRAM(S): 200 CAPSULE ORAL at 08:40

## 2019-01-30 RX ADMIN — Medication 975 MILLIGRAM(S): at 21:59

## 2019-01-30 RX ADMIN — OXYCODONE HYDROCHLORIDE 5 MILLIGRAM(S): 5 TABLET ORAL at 22:00

## 2019-01-30 RX ADMIN — Medication 325 MILLIGRAM(S): at 17:59

## 2019-01-30 RX ADMIN — Medication 975 MILLIGRAM(S): at 14:16

## 2019-01-30 RX ADMIN — OXYCODONE HYDROCHLORIDE 20 MILLIGRAM(S): 5 TABLET ORAL at 08:40

## 2019-01-30 RX ADMIN — Medication 100 MILLIGRAM(S): at 14:16

## 2019-01-30 RX ADMIN — Medication 100 MILLIGRAM(S): at 17:59

## 2019-01-30 RX ADMIN — Medication 1000 MILLIGRAM(S): at 13:23

## 2019-01-30 RX ADMIN — SODIUM CHLORIDE 100 MILLILITER(S): 9 INJECTION, SOLUTION INTRAVENOUS at 12:36

## 2019-01-30 RX ADMIN — OXYCODONE HYDROCHLORIDE 5 MILLIGRAM(S): 5 TABLET ORAL at 15:15

## 2019-01-30 RX ADMIN — OXYCODONE HYDROCHLORIDE 5 MILLIGRAM(S): 5 TABLET ORAL at 14:16

## 2019-01-30 RX ADMIN — OXYCODONE HYDROCHLORIDE 5 MILLIGRAM(S): 5 TABLET ORAL at 23:00

## 2019-01-30 RX ADMIN — OXYCODONE HYDROCHLORIDE 5 MILLIGRAM(S): 5 TABLET ORAL at 18:41

## 2019-01-30 RX ADMIN — OXYCODONE HYDROCHLORIDE 5 MILLIGRAM(S): 5 TABLET ORAL at 17:59

## 2019-01-30 RX ADMIN — Medication 975 MILLIGRAM(S): at 15:15

## 2019-01-30 RX ADMIN — Medication 650 MILLIGRAM(S): at 08:40

## 2019-01-30 RX ADMIN — Medication 400 MILLIGRAM(S): at 13:00

## 2019-01-30 RX ADMIN — SODIUM CHLORIDE 150 MILLILITER(S): 9 INJECTION, SOLUTION INTRAVENOUS at 14:17

## 2019-01-30 RX ADMIN — Medication 975 MILLIGRAM(S): at 22:59

## 2019-01-30 NOTE — DISCHARGE NOTE ADULT - PATIENT PORTAL LINK FT
You can access the [email or web address] Patient Portal, offered by [facility], by registering with the following website: [email or web address]

## 2019-01-30 NOTE — OCCUPATIONAL THERAPY INITIAL EVALUATION ADULT - PLANNED THERAPY INTERVENTIONS, OT EVAL
strengthening/ADL retraining/balance training/bed mobility training/transfer training/ROM/stretching

## 2019-01-30 NOTE — DISCHARGE NOTE ADULT - MEDICATION SUMMARY - MEDICATIONS TO STOP TAKING
I will STOP taking the medications listed below when I get home from the hospital:    algae  -- orally once a day    Aleve 220 mg oral tablet  -- 1 tab(s) by mouth every 8 hours, As Needed

## 2019-01-30 NOTE — CONSULT NOTE ADULT - SUBJECTIVE AND OBJECTIVE BOX
HERON RICK is a 59y Female s/p RIGHT TOTAL KNEE ARTHROPLASTY    w/ h/o Unilateral primary osteoarthritis, right knee  Umbilical hernia without obstruction and without gangrene  Sleep apnea    denies any chest pain shortness of breath palpitation dizziness lightheadedness nausea vomiting fever or chills    S/P arthroscopic surgery of right knee  H/O tubal ligation  H/O bilateral oophorectomy  H/O ventral hernia repair  H/O sinus surgery  S/P left knee arthroscopy    Family history of ovarian cancer (Mother, Sibling)    SH: doesnot smoke or drink at this time    Bananas (Vomiting; Diarrhea)  penicillins (Rash)    acetaminophen   Tablet .. 975 milliGRAM(s) Oral every 8 hours  acetaminophen  IVPB .. 1000 milliGRAM(s) IV Intermittent once  aluminum hydroxide/magnesium hydroxide/simethicone Suspension 30 milliLiter(s) Oral four times a day PRN  ascorbic acid 500 milliGRAM(s) Oral two times a day  ceFAZolin   IVPB 2000 milliGRAM(s) IV Intermittent every 8 hours  diphenhydrAMINE 25 milliGRAM(s) Oral every 6 hours PRN  docusate sodium 100 milliGRAM(s) Oral three times a day  ferrous    sulfate 325 milliGRAM(s) Oral three times a day with meals  folic acid 1 milliGRAM(s) Oral daily  HYDROmorphone  Injectable 1 milliGRAM(s) IV Push every 4 hours PRN  lactated ringers. 1000 milliLiter(s) IV Continuous <Continuous>  magnesium hydroxide Suspension 30 milliLiter(s) Oral daily PRN  multivitamin 1 Tablet(s) Oral daily  ondansetron Injectable 4 milliGRAM(s) IV Push every 6 hours PRN  oxyCODONE    IR 5 milliGRAM(s) Oral every 4 hours PRN  oxyCODONE    IR 10 milliGRAM(s) Oral every 4 hours PRN  oxyCODONE    IR 5 milliGRAM(s) Oral every 4 hours  pantoprazole    Tablet 40 milliGRAM(s) Oral before breakfast  polyethylene glycol 3350 17 Gram(s) Oral daily  senna 2 Tablet(s) Oral at bedtime PRN  zolpidem 5 milliGRAM(s) Oral at bedtime PRN    T(C): 36.6 (01-30-19 @ 16:37), Max: 36.6 (01-30-19 @ 16:37)  HR: 72 (01-30-19 @ 17:41) (57 - 82)  BP: 125/66 (01-30-19 @ 17:41) (103/60 - 131/79)  RR: 16 (01-30-19 @ 17:41) (12 - 18)  SpO2: 98% (01-30-19 @ 17:41) (93% - 100%)  HEENT unremarkable  neck no JVD or bruit  heart normal S1 S2 RRR no gallops or rubs  chest clear to auscultation  abd sof nontender non distended +bs  ext no calf tenderness    A/P   DVT PX  pain control  bowel regimen   wound care as per ortho  GI PX  antiemetics prn  incentive spirometer

## 2019-01-30 NOTE — PROGRESS NOTE ADULT - SUBJECTIVE AND OBJECTIVE BOX
Patient is seen and examined at bedside. Denies CP/SOB/Dizziness/N/V/D/HA. Pain is controlled.     Vital Signs Last 24 Hrs  T(C): 34.9 (30 Jan 2019 13:54), Max: 36.2 (30 Jan 2019 08:22)  T(F): 94.9 (30 Jan 2019 13:54), Max: 97.2 (30 Jan 2019 08:22)  HR: 67 (30 Jan 2019 14:40) (57 - 72)  BP: 114/74 (30 Jan 2019 14:40) (103/60 - 131/79)  BP(mean): --  RR: 16 (30 Jan 2019 14:40) (12 - 18)  SpO2: 93% (30 Jan 2019 14:40) (93% - 100%)      PHYSICAL EXAM:  General: NAD, WDWN.   Neuro:  Alert & responsive  HEENT: NCAT, EOMI, conjunctiva clear  abd: soft, NT/ND  Right knee: Dressing C/D/I with ACE wrap in place.   Right LE: Motor intact + EHL/FHL/TA/GS.  Sensation is grossly intact.  Extremity warm, compartments soft, compressible. No calf tenderness. DP 2+   Left LE: Motor intact +EHL/FHL/TA/GS. Sensation is grossly intact. Extremity warm, compartments soft, compressible. No calf tenderness. DP2+    Labs:                          12.7   10.32 )-----------( 224      ( 30 Jan 2019 13:01 )             39.2       01-30    144  |  112<H>  |  18  ----------------------------<  98  4.3   |  25  |  0.86    Ca    8.4<L>      30 Jan 2019 13:01        A/P: Patient is a 59y y/o Female s/p right total knee arthroplasty, POD # 0  -wound care, knee extension/leg elevation, cryocuff, isometric exercises, new medications reviewed with pt  -Pain control/analgesia  -Inc spirometry reviewed with pt, demonstrated competence  -DVT prophylaxis with Venodynes/Aspirin  -F/U AM Labs  -PT/OT/WBAT  -complete prophylactic Antibiotic  -medical consult Dr Brown  -[place] planning: home tomorrow

## 2019-01-30 NOTE — DISCHARGE NOTE ADULT - CARE PLAN
Principal Discharge DX:	Unilateral primary osteoarthritis, right knee  Goal:	improved function, decreased pain  Assessment and plan of treatment:	Keep MONIKA Dressing Clean, Dry and Intact. May shower with MONIKA Dressing. Please do not scrub, soak, peel or pick at the MONIKA dressing. No creams, lotions, or oils over dressing. May shower and let water run over dressing, no baths. Pat dry once out of shower. Dressing to be removed in 7 days. If dressing is saturated from border to border - may remove and replace with clean dry dressing.    Shower instructions for MONIKA Dressing: Turn battery pack off. Twist OFF battery pack before entering shower. Once done with showering. Pat dressing dry. Reconnect and twist ON battery pack after you are dry. Then turn battery pack on. Principal Discharge DX:	Unilateral primary osteoarthritis, right knee  Goal:	improved function, decreased pain  Assessment and plan of treatment:	Keep Prineo Dressing Clean, Dry and Intact. May shower with Prineo Dressing. Please do not scrub, soak, peel or pick at the prineo dressing. No creams, lotions, or oils over dressing. May shower and let water run over incision, no baths. Pat dry once out of shower. Dressing to be removed in office at follow up visit in 2 weeks.

## 2019-01-30 NOTE — ASU PREOP CHECKLIST - SKIN PREP
Detail Level: Zone Plan: Had small AK of right lower vermilion lip at last visit\\nTreated with LN2 at that time and now clinically resolved\\nPhotographed today\\nAdvised patient to continue sun protection with hat, sunscreen and lip balm with sunscreen\\nAnnual TBSE due to history of AK recommended, due again 7/2019 done

## 2019-01-30 NOTE — DISCHARGE NOTE ADULT - HOSPITAL COURSE
59yFemale with history of right knee osteoarthritis presenting for right total knee arthroplasty by Dr Altaf Goodman on 1/30/2019. Risk and benefits of surgery were explained to the patient. The patient understood and agreed to proceed with surgery. Patient underwent the procedure with no intraoperative complications. Pt was brought in stable condition to the PACU. Once stable in PACU, pt was brought to the floor. During hospital stay pt was followed by Medicine, PT/OT/homecare during this admission. Pt had an uneventful hospital course. Pt is stable for discharge to home on POD#1

## 2019-01-30 NOTE — DISCHARGE NOTE ADULT - MEDICATION SUMMARY - MEDICATIONS TO TAKE
I will START or STAY ON the medications listed below when I get home from the hospital:    Allergy Tablet  -- 1 tab(s) by mouth once a day  -- Indication: For home medication    acetaminophen 325 mg oral tablet  -- 3 tab(s) by mouth every 8 hours  -- Indication: For pain (do not take more than 4000mg/4gm in 24 hours)    aspirin 325 mg oral delayed release tablet  -- 1 tab(s) by mouth 2 times a day MDD:2  -- Indication: For Prevent blood clots    celecoxib 200 mg oral capsule  -- 1 cap(s) by mouth 2 times a day MDD:2  -- Indication: For pain /doctor recommended    oxyCODONE 10 mg oral tablet  -- 1 tab(s) by mouth every 4 hours, As needed moderate to severe pain MDD:6  -- Indication: For pain (may split in half for lesser pain)    magnesium hydroxide 8% oral suspension  -- 30 milliliter(s) by mouth once a day, As needed, Constipation  -- Indication: For treat constipation    senna oral tablet  -- 2 tab(s) by mouth once a day (at bedtime), As needed, Constipation  -- Indication: For treat constipation    docusate sodium 100 mg oral capsule  -- 1 cap(s) by mouth 3 times a day  -- Indication: For prevent constipation    pantoprazole 40 mg oral delayed release tablet  -- 1 tab(s) by mouth once a day (before a meal) MDD:1  -- Indication: For protect stomach    Multiple Vitamins oral tablet  -- 1 tab(s) by mouth once a day  -- Indication: For wound healing    ascorbic acid 500 mg oral tablet  -- 1 tab(s) by mouth 2 times a day  -- Indication: For wound healing

## 2019-01-30 NOTE — DISCHARGE NOTE ADULT - NS AS ACTIVITY OBS
Walking-Outdoors allowed/Do not make important decisions/Showering allowed/Keep knee straight while at rest. Elevate the leg as much as possible ("toes above the nose") to help control swelling. Make sure you get up and take a brief walk every two hours to help with circulation and prevent stiffness. Incentive spirometer 10X/hour. Cryocuff to help with pain/inflammation./Walking-Indoors allowed/Do not drive or operate machinery/Stairs allowed/No Heavy lifting/straining

## 2019-01-30 NOTE — PHYSICAL THERAPY INITIAL EVALUATION ADULT - CRITERIA FOR SKILLED THERAPEUTIC INTERVENTIONS
therapy frequency/impairments found/risk reduction/prevention/predicted duration of therapy intervention/functional limitations in following categories/anticipated discharge recommendation

## 2019-01-30 NOTE — OCCUPATIONAL THERAPY INITIAL EVALUATION ADULT - ADDITIONAL COMMENTS
Pre op assessment- Pt is right handed and wears separate glasses for distance and for reading.  Patient drives.  Lives in private home with 3-4 steps and bilateral rails to enter.  Reports bedroom and bathroom all on one level after entering

## 2019-01-30 NOTE — BRIEF OPERATIVE NOTE - PROCEDURE
<<-----Click on this checkbox to enter Procedure Right total knee arthroplasty  01/30/2019    Active  [id number]

## 2019-01-30 NOTE — DISCHARGE NOTE ADULT - PLAN OF CARE
improved function, decreased pain Keep MONIKA Dressing Clean, Dry and Intact. May shower with MONIKA Dressing. Please do not scrub, soak, peel or pick at the MONIKA dressing. No creams, lotions, or oils over dressing. May shower and let water run over dressing, no baths. Pat dry once out of shower. Dressing to be removed in 7 days. If dressing is saturated from border to border - may remove and replace with clean dry dressing.    Shower instructions for MONIKA Dressing: Turn battery pack off. Twist OFF battery pack before entering shower. Once done with showering. Pat dressing dry. Reconnect and twist ON battery pack after you are dry. Then turn battery pack on. Keep Prineo Dressing Clean, Dry and Intact. May shower with Prineo Dressing. Please do not scrub, soak, peel or pick at the prineo dressing. No creams, lotions, or oils over dressing. May shower and let water run over incision, no baths. Pat dry once out of shower. Dressing to be removed in office at follow up visit in 2 weeks.

## 2019-01-30 NOTE — DISCHARGE NOTE ADULT - HOME CARE AGENCY
RN from [facility], [phone], will visit your home the day after discharge.  Skilled Nursing, Occupational Therapy, and  Physical Therapy evaluation will be provided.

## 2019-01-30 NOTE — DISCHARGE NOTE ADULT - CARE PROVIDER_API CALL
Altaf Goodman)  Orthopaedic Surgery  [address] [place] [address]  Phone: [phone]  Fax: [phone]

## 2019-01-30 NOTE — PHYSICAL THERAPY INITIAL EVALUATION ADULT - ADDITIONAL COMMENTS
As per pre-op: Pt is right handed and wears separate glasses for distance and for reading.  Patient drives.  Lives in private home with 3-4 steps and bilateral rails to enter.  Reports bedroom and bathroom all on one level after entering.

## 2019-01-30 NOTE — DISCHARGE NOTE ADULT - ADDITIONAL INSTRUCTIONS
Follow up with your surgeon in two weeks. Call for appointment.  If you need more pain medication, call your surgeon's office.  Call and schedule a follow up appointment with your primary care physician for repeat blood work (CBC and BMP) for post hospital discharge follow-up care.  Call your surgeon if you have increased redness/pain/drainage or fever. Return to ER for shortness of breath/calf tenderness.

## 2019-01-31 VITALS
DIASTOLIC BLOOD PRESSURE: 61 MMHG | TEMPERATURE: 98 F | SYSTOLIC BLOOD PRESSURE: 119 MMHG | RESPIRATION RATE: 15 BRPM | HEART RATE: 78 BPM | OXYGEN SATURATION: 95 %

## 2019-01-31 LAB
ANION GAP SERPL CALC-SCNC: 8 MMOL/L — SIGNIFICANT CHANGE UP (ref 5–17)
BUN SERPL-MCNC: 19 MG/DL — SIGNIFICANT CHANGE UP (ref 7–23)
CALCIUM SERPL-MCNC: 8.6 MG/DL — SIGNIFICANT CHANGE UP (ref 8.5–10.1)
CHLORIDE SERPL-SCNC: 111 MMOL/L — HIGH (ref 96–108)
CO2 SERPL-SCNC: 24 MMOL/L — SIGNIFICANT CHANGE UP (ref 22–31)
CREAT SERPL-MCNC: 1 MG/DL — SIGNIFICANT CHANGE UP (ref 0.5–1.3)
GLUCOSE SERPL-MCNC: 112 MG/DL — HIGH (ref 70–99)
HCT VFR BLD CALC: 36.8 % — SIGNIFICANT CHANGE UP (ref 34.5–45)
HGB BLD-MCNC: 11.7 G/DL — SIGNIFICANT CHANGE UP (ref 11.5–15.5)
MCHC RBC-ENTMCNC: 29.1 PG — SIGNIFICANT CHANGE UP (ref 27–34)
MCHC RBC-ENTMCNC: 31.8 GM/DL — LOW (ref 32–36)
MCV RBC AUTO: 91.5 FL — SIGNIFICANT CHANGE UP (ref 80–100)
NRBC # BLD: 0 /100 WBCS — SIGNIFICANT CHANGE UP (ref 0–0)
PLATELET # BLD AUTO: 237 K/UL — SIGNIFICANT CHANGE UP (ref 150–400)
POTASSIUM SERPL-MCNC: 4 MMOL/L — SIGNIFICANT CHANGE UP (ref 3.5–5.3)
POTASSIUM SERPL-SCNC: 4 MMOL/L — SIGNIFICANT CHANGE UP (ref 3.5–5.3)
RBC # BLD: 4.02 M/UL — SIGNIFICANT CHANGE UP (ref 3.8–5.2)
RBC # FLD: 14.1 % — SIGNIFICANT CHANGE UP (ref 10.3–14.5)
SODIUM SERPL-SCNC: 143 MMOL/L — SIGNIFICANT CHANGE UP (ref 135–145)
WBC # BLD: 14.33 K/UL — HIGH (ref 3.8–10.5)
WBC # FLD AUTO: 14.33 K/UL — HIGH (ref 3.8–10.5)

## 2019-01-31 PROCEDURE — 99238 HOSP IP/OBS DSCHRG MGMT 30/<: CPT

## 2019-01-31 RX ORDER — SENNA PLUS 8.6 MG/1
2 TABLET ORAL
Qty: 0 | Refills: 0 | DISCHARGE
Start: 2019-01-31

## 2019-01-31 RX ORDER — CELECOXIB 200 MG/1
1 CAPSULE ORAL
Qty: 60 | Refills: 0
Start: 2019-01-31 | End: 2019-03-01

## 2019-01-31 RX ORDER — OXYCODONE HYDROCHLORIDE 5 MG/1
1 TABLET ORAL
Qty: 42 | Refills: 0
Start: 2019-01-31 | End: 2019-02-06

## 2019-01-31 RX ORDER — MULTIVIT-MIN/FERROUS GLUCONATE 9 MG/15 ML
1 LIQUID (ML) ORAL
Qty: 0 | Refills: 0 | COMMUNITY

## 2019-01-31 RX ORDER — L.ACIDOPH/B.ANIMALIS/B.LONGUM 15B CELL
1 CAPSULE ORAL
Qty: 0 | Refills: 0 | COMMUNITY

## 2019-01-31 RX ORDER — ACETAMINOPHEN 500 MG
3 TABLET ORAL
Qty: 0 | Refills: 0 | DISCHARGE
Start: 2019-01-31

## 2019-01-31 RX ORDER — PANTOPRAZOLE SODIUM 20 MG/1
1 TABLET, DELAYED RELEASE ORAL
Qty: 30 | Refills: 0
Start: 2019-01-31 | End: 2019-03-01

## 2019-01-31 RX ORDER — CELECOXIB 200 MG/1
1 CAPSULE ORAL
Qty: 0 | Refills: 0 | COMMUNITY
Start: 2019-01-31

## 2019-01-31 RX ORDER — ASCORBIC ACID 60 MG
1 TABLET,CHEWABLE ORAL
Qty: 0 | Refills: 0 | DISCHARGE
Start: 2019-01-31

## 2019-01-31 RX ORDER — ASPIRIN/CALCIUM CARB/MAGNESIUM 324 MG
1 TABLET ORAL
Qty: 60 | Refills: 0
Start: 2019-01-31 | End: 2019-03-01

## 2019-01-31 RX ORDER — INFLUENZA VIRUS VACCINE 15; 15; 15; 15 UG/.5ML; UG/.5ML; UG/.5ML; UG/.5ML
0.5 SUSPENSION INTRAMUSCULAR ONCE
Qty: 0 | Refills: 0 | Status: COMPLETED | OUTPATIENT
Start: 2019-01-31 | End: 2019-01-31

## 2019-01-31 RX ORDER — MAGNESIUM HYDROXIDE 400 MG/1
30 TABLET, CHEWABLE ORAL
Qty: 0 | Refills: 0 | DISCHARGE
Start: 2019-01-31

## 2019-01-31 RX ORDER — DOCUSATE SODIUM 100 MG
1 CAPSULE ORAL
Qty: 0 | Refills: 0 | DISCHARGE
Start: 2019-01-31

## 2019-01-31 RX ADMIN — Medication 102 MILLIGRAM(S): at 06:07

## 2019-01-31 RX ADMIN — Medication 1 MILLIGRAM(S): at 12:08

## 2019-01-31 RX ADMIN — CELECOXIB 200 MILLIGRAM(S): 200 CAPSULE ORAL at 06:05

## 2019-01-31 RX ADMIN — Medication 100 MILLIGRAM(S): at 13:35

## 2019-01-31 RX ADMIN — Medication 325 MILLIGRAM(S): at 06:05

## 2019-01-31 RX ADMIN — Medication 975 MILLIGRAM(S): at 06:05

## 2019-01-31 RX ADMIN — OXYCODONE HYDROCHLORIDE 5 MILLIGRAM(S): 5 TABLET ORAL at 11:28

## 2019-01-31 RX ADMIN — OXYCODONE HYDROCHLORIDE 5 MILLIGRAM(S): 5 TABLET ORAL at 06:06

## 2019-01-31 RX ADMIN — INFLUENZA VIRUS VACCINE 0.5 MILLILITER(S): 15; 15; 15; 15 SUSPENSION INTRAMUSCULAR at 12:09

## 2019-01-31 RX ADMIN — Medication 1 TABLET(S): at 12:10

## 2019-01-31 RX ADMIN — Medication 325 MILLIGRAM(S): at 12:11

## 2019-01-31 RX ADMIN — OXYCODONE HYDROCHLORIDE 10 MILLIGRAM(S): 5 TABLET ORAL at 13:25

## 2019-01-31 RX ADMIN — PANTOPRAZOLE SODIUM 40 MILLIGRAM(S): 20 TABLET, DELAYED RELEASE ORAL at 06:05

## 2019-01-31 RX ADMIN — Medication 975 MILLIGRAM(S): at 13:35

## 2019-01-31 RX ADMIN — OXYCODONE HYDROCHLORIDE 5 MILLIGRAM(S): 5 TABLET ORAL at 04:14

## 2019-01-31 RX ADMIN — Medication 100 MILLIGRAM(S): at 06:05

## 2019-01-31 RX ADMIN — Medication 975 MILLIGRAM(S): at 07:05

## 2019-01-31 RX ADMIN — Medication 975 MILLIGRAM(S): at 13:47

## 2019-01-31 RX ADMIN — OXYCODONE HYDROCHLORIDE 10 MILLIGRAM(S): 5 TABLET ORAL at 07:25

## 2019-01-31 RX ADMIN — POLYETHYLENE GLYCOL 3350 17 GRAM(S): 17 POWDER, FOR SOLUTION ORAL at 12:10

## 2019-01-31 RX ADMIN — SODIUM CHLORIDE 150 MILLILITER(S): 9 INJECTION, SOLUTION INTRAVENOUS at 06:04

## 2019-01-31 RX ADMIN — OXYCODONE HYDROCHLORIDE 10 MILLIGRAM(S): 5 TABLET ORAL at 08:25

## 2019-01-31 RX ADMIN — OXYCODONE HYDROCHLORIDE 5 MILLIGRAM(S): 5 TABLET ORAL at 03:14

## 2019-01-31 RX ADMIN — OXYCODONE HYDROCHLORIDE 5 MILLIGRAM(S): 5 TABLET ORAL at 07:05

## 2019-01-31 RX ADMIN — OXYCODONE HYDROCHLORIDE 5 MILLIGRAM(S): 5 TABLET ORAL at 10:53

## 2019-01-31 RX ADMIN — Medication 325 MILLIGRAM(S): at 07:23

## 2019-01-31 RX ADMIN — Medication 500 MILLIGRAM(S): at 06:05

## 2019-01-31 RX ADMIN — CELECOXIB 200 MILLIGRAM(S): 200 CAPSULE ORAL at 07:05

## 2019-01-31 RX ADMIN — Medication 100 MILLIGRAM(S): at 03:31

## 2019-01-31 RX ADMIN — OXYCODONE HYDROCHLORIDE 10 MILLIGRAM(S): 5 TABLET ORAL at 12:30

## 2019-01-31 NOTE — PROGRESS NOTE ADULT - SUBJECTIVE AND OBJECTIVE BOX
Patient is seen and examined at bedside. Denies CP/SOB/Dizziness/N/V/D/HA. Pain is controlled.     Vital Signs Last 24 Hrs  T(C): 36.3 (31 Jan 2019 09:01), Max: 36.6 (30 Jan 2019 16:37)  T(F): 97.4 (31 Jan 2019 09:01), Max: 97.8 (30 Jan 2019 16:37)  HR: 58 (31 Jan 2019 09:01) (57 - 82)  BP: 114/70 (31 Jan 2019 09:01) (103/60 - 131/79)  BP(mean): --  RR: 19 (31 Jan 2019 09:01) (12 - 19)  SpO2: 99% (31 Jan 2019 09:01) (93% - 100%)      PHYSICAL EXAM:  General: NAD, WDWN.   Neuro:  Alert & responsive  HEENT: NCAT, EOMI, conjunctiva clear  abd: soft, NT/ND  Right knee: Prineo Dressing C/D/I  Incision: C/D/I  Right LE: Motor intact + EHL/FHL/TA/GS.  Sensation is grossly intact.  Extremity warm, compartments soft, compressible. No calf tenderness. DP 2+   Left LE: Motor intact +EHL/FHL/TA/GS. Sensation is grossly intact. Extremity warm, compartments soft, compressible. No calf tenderness. DP2+    Labs:                          11.7   14.33 )-----------( 237      ( 31 Jan 2019 07:24 )             36.8       01-31    143  |  111<H>  |  19  ----------------------------<  112<H>  4.0   |  24  |  1.00    Ca    8.6      31 Jan 2019 07:23        A/P: Patient is a 59y y/o Female s/p right total knee arthroplasty, POD # 1  -wound care, knee extension/leg elevation, cryocuff, isometric exercises, new medications reviewed with pt  -Pain control/analgesia  -Inc spirometry reviewed with pt, demonstrated competence  -DVT prophylaxis with Venodynes/Aspirin  -PT/OT/WBAT  -complete prophylactic Antibiotic  -medical consult- Dr Brown  -ME planning: home today  -Pt seen in am with DR Goodman

## 2019-01-31 NOTE — PROGRESS NOTE ADULT - SUBJECTIVE AND OBJECTIVE BOX
HERON RICK is a 59y Female s/p RIGHT TOTAL KNEE ARTHROPLASTY      denies any chest pain shortness of breath palpitation dizziness lightheadedness nausea vomiting fever or chills    T(C): 36.3 (01-31-19 @ 09:01), Max: 36.6 (01-30-19 @ 16:37)  HR: 58 (01-31-19 @ 09:01) (57 - 82)  BP: 114/70 (01-31-19 @ 09:01) (103/60 - 131/79)  RR: 19 (01-31-19 @ 09:01) (12 - 19)  SpO2: 99% (01-31-19 @ 09:01) (93% - 100%)  no jvd/bruit  s1 s2 rrr  cta  s/nt/nd  no calf tend                          11.7   14.33 )-----------( 237      ( 31 Jan 2019 07:24 )             36.8   01-31    143  |  111<H>  |  19  ----------------------------<  112<H>  4.0   |  24  |  1.00    Ca    8.6      31 Jan 2019 07:23    ^wbc f/u o/p  cont dvt px  pain control  bowel regimen  antiemetics  incentive spirometer

## 2019-02-01 LAB — SURGICAL PATHOLOGY STUDY: SIGNIFICANT CHANGE UP

## 2019-02-05 DIAGNOSIS — M17.11 UNILATERAL PRIMARY OSTEOARTHRITIS, RIGHT KNEE: ICD-10-CM

## 2019-02-05 DIAGNOSIS — Z91.018 ALLERGY TO OTHER FOODS: ICD-10-CM

## 2019-02-05 DIAGNOSIS — Z88.0 ALLERGY STATUS TO PENICILLIN: ICD-10-CM

## 2019-02-05 DIAGNOSIS — Z98.51 TUBAL LIGATION STATUS: ICD-10-CM

## 2019-02-05 DIAGNOSIS — G47.30 SLEEP APNEA, UNSPECIFIED: ICD-10-CM

## 2019-02-05 DIAGNOSIS — Z90.722 ACQUIRED ABSENCE OF OVARIES, BILATERAL: ICD-10-CM

## 2019-02-05 DIAGNOSIS — D72.829 ELEVATED WHITE BLOOD CELL COUNT, UNSPECIFIED: ICD-10-CM

## 2019-02-05 DIAGNOSIS — Z23 ENCOUNTER FOR IMMUNIZATION: ICD-10-CM

## 2019-06-24 ENCOUNTER — TRANSCRIPTION ENCOUNTER (OUTPATIENT)
Age: 59
End: 2019-06-24

## 2020-06-09 PROBLEM — M17.11 UNILATERAL PRIMARY OSTEOARTHRITIS, RIGHT KNEE: Chronic | Status: ACTIVE | Noted: 2019-01-16

## 2020-06-12 ENCOUNTER — OUTPATIENT (OUTPATIENT)
Dept: OUTPATIENT SERVICES | Facility: HOSPITAL | Age: 60
LOS: 1 days | Discharge: HOME | End: 2020-06-12
Payer: COMMERCIAL

## 2020-06-12 VITALS
DIASTOLIC BLOOD PRESSURE: 92 MMHG | HEIGHT: 68 IN | RESPIRATION RATE: 16 BRPM | TEMPERATURE: 98 F | WEIGHT: 203.27 LBS | HEART RATE: 79 BPM | SYSTOLIC BLOOD PRESSURE: 156 MMHG | OXYGEN SATURATION: 96 %

## 2020-06-12 DIAGNOSIS — Z90.722 ACQUIRED ABSENCE OF OVARIES, BILATERAL: Chronic | ICD-10-CM

## 2020-06-12 DIAGNOSIS — Z01.818 ENCOUNTER FOR OTHER PREPROCEDURAL EXAMINATION: ICD-10-CM

## 2020-06-12 DIAGNOSIS — Z98.51 TUBAL LIGATION STATUS: Chronic | ICD-10-CM

## 2020-06-12 DIAGNOSIS — S83.232A COMPLEX TEAR OF MEDIAL MENISCUS, CURRENT INJURY, LEFT KNEE, INITIAL ENCOUNTER: ICD-10-CM

## 2020-06-12 DIAGNOSIS — Z96.651 PRESENCE OF RIGHT ARTIFICIAL KNEE JOINT: Chronic | ICD-10-CM

## 2020-06-12 DIAGNOSIS — Z98.890 OTHER SPECIFIED POSTPROCEDURAL STATES: Chronic | ICD-10-CM

## 2020-06-12 DIAGNOSIS — G47.33 OBSTRUCTIVE SLEEP APNEA (ADULT) (PEDIATRIC): ICD-10-CM

## 2020-06-12 LAB
ANION GAP SERPL CALC-SCNC: 9 MMOL/L — SIGNIFICANT CHANGE UP (ref 5–17)
BASOPHILS # BLD AUTO: 0.05 K/UL — SIGNIFICANT CHANGE UP (ref 0–0.2)
BASOPHILS NFR BLD AUTO: 0.6 % — SIGNIFICANT CHANGE UP (ref 0–2)
BUN SERPL-MCNC: 19 MG/DL — SIGNIFICANT CHANGE UP (ref 7–23)
CALCIUM SERPL-MCNC: 9.1 MG/DL — SIGNIFICANT CHANGE UP (ref 8.5–10.1)
CHLORIDE SERPL-SCNC: 104 MMOL/L — SIGNIFICANT CHANGE UP (ref 96–108)
CO2 SERPL-SCNC: 27 MMOL/L — SIGNIFICANT CHANGE UP (ref 22–31)
CREAT SERPL-MCNC: 1.19 MG/DL — SIGNIFICANT CHANGE UP (ref 0.5–1.3)
EOSINOPHIL # BLD AUTO: 0.21 K/UL — SIGNIFICANT CHANGE UP (ref 0–0.5)
EOSINOPHIL NFR BLD AUTO: 2.4 % — SIGNIFICANT CHANGE UP (ref 0–6)
GLUCOSE SERPL-MCNC: 96 MG/DL — SIGNIFICANT CHANGE UP (ref 70–99)
HCT VFR BLD CALC: 44.4 % — SIGNIFICANT CHANGE UP (ref 34.5–45)
HGB BLD-MCNC: 14.4 G/DL — SIGNIFICANT CHANGE UP (ref 11.5–15.5)
IMM GRANULOCYTES NFR BLD AUTO: 0.2 % — SIGNIFICANT CHANGE UP (ref 0–1.5)
LYMPHOCYTES # BLD AUTO: 1.88 K/UL — SIGNIFICANT CHANGE UP (ref 1–3.3)
LYMPHOCYTES # BLD AUTO: 21.5 % — SIGNIFICANT CHANGE UP (ref 13–44)
MCHC RBC-ENTMCNC: 29.5 PG — SIGNIFICANT CHANGE UP (ref 27–34)
MCHC RBC-ENTMCNC: 32.4 GM/DL — SIGNIFICANT CHANGE UP (ref 32–36)
MCV RBC AUTO: 91 FL — SIGNIFICANT CHANGE UP (ref 80–100)
MONOCYTES # BLD AUTO: 0.74 K/UL — SIGNIFICANT CHANGE UP (ref 0–0.9)
MONOCYTES NFR BLD AUTO: 8.5 % — SIGNIFICANT CHANGE UP (ref 2–14)
NEUTROPHILS # BLD AUTO: 5.83 K/UL — SIGNIFICANT CHANGE UP (ref 1.8–7.4)
NEUTROPHILS NFR BLD AUTO: 66.8 % — SIGNIFICANT CHANGE UP (ref 43–77)
NRBC # BLD: 0 /100 WBCS — SIGNIFICANT CHANGE UP (ref 0–0)
PLATELET # BLD AUTO: 287 K/UL — SIGNIFICANT CHANGE UP (ref 150–400)
POTASSIUM SERPL-MCNC: 3.9 MMOL/L — SIGNIFICANT CHANGE UP (ref 3.5–5.3)
POTASSIUM SERPL-SCNC: 3.9 MMOL/L — SIGNIFICANT CHANGE UP (ref 3.5–5.3)
RBC # BLD: 4.88 M/UL — SIGNIFICANT CHANGE UP (ref 3.8–5.2)
RBC # FLD: 14.6 % — HIGH (ref 10.3–14.5)
SODIUM SERPL-SCNC: 140 MMOL/L — SIGNIFICANT CHANGE UP (ref 135–145)
WBC # BLD: 8.73 K/UL — SIGNIFICANT CHANGE UP (ref 3.8–10.5)
WBC # FLD AUTO: 8.73 K/UL — SIGNIFICANT CHANGE UP (ref 3.8–10.5)

## 2020-06-12 PROCEDURE — 93010 ELECTROCARDIOGRAM REPORT: CPT

## 2020-06-12 RX ORDER — SODIUM CHLORIDE 9 MG/ML
3 INJECTION INTRAMUSCULAR; INTRAVENOUS; SUBCUTANEOUS EVERY 8 HOURS
Refills: 0 | Status: DISCONTINUED | OUTPATIENT
Start: 2020-06-17 | End: 2020-07-02

## 2020-06-12 NOTE — H&P PST ADULT - NSICDXFAMILYHX_GEN_ALL_CORE_FT
FAMILY HISTORY:  Mother  Still living? No  Family history of ovarian cancer, Age at diagnosis: Age Unknown    Sibling  Still living? Yes, Estimated age: Age Unknown  Family history of ovarian cancer, Age at diagnosis: Age Unknown

## 2020-06-12 NOTE — H&P PST ADULT - LAST PROSTATE EXAM
- follow up blood and wound cultures  - tylenol for fever/pain  - IVF @100 x 12 hours  - broad spectrum abx  - monitor labs daily - follow up blood and wound cultures  - tylenol for fever/pain  - IVF @100 x 12 hours  - broad spectrum abx, Zosyn and Vanco   - monitor labs daily -stable VS, afebrile, downtrending leukocytosis   - follow up blood and wound cultures  - tylenol for fever/pain  - IVF @100 x 12 hours  - broad spectrum abx, Zosyn and Vanco   - monitor labs daily -stable VS, afebrile, downtrending leukocytosis   - f/u BCx1: NGTD  - f/u wound culture  - tylenol for fever/pain  - c/w broad spectrum abx as above  - fever trend, monitor leukocytosis qd -stable VS, afebrile, downtrending leukocytosis   - f/u BCx1: NGTD  - f/u wound culture  - tylenol for fever/pain  - c/w broad spectrum abx as above but consider downgrading once cultures come back  - fever trend, monitor leukocytosis qd n/a

## 2020-06-12 NOTE — H&P PST ADULT - MUSCULOSKELETAL
details… detailed exam decreased ROM due to pain/joint swelling/diminished strength/no joint erythema/no joint warmth/no calf tenderness/decreased ROM

## 2020-06-12 NOTE — H&P PST ADULT - NSICDXPROBLEM_GEN_ALL_CORE_FT
PROBLEM DIAGNOSES  Problem: Complex tear of medial meniscus, current injury, left knee, initial encounter  Assessment and Plan: Pre-op instructions given. Pt verbalized understanding  Chlorhexidine wash instructions given  Pending: M/C Abnormal ekg +Pt to return for Covid19 test 6/13/2020    Problem: VIVI on CPAP  Assessment and Plan: Pt reports seldom use PROBLEM DIAGNOSES  Problem: Complex tear of medial meniscus, current injury, left knee, initial encounter  Assessment and Plan: Pre-op instructions given. Pt verbalized understanding  Chlorhexidine wash instructions given  Pending: M/C Abnormal ekg, Elevated BP with no h/o HTN +Pt to return for Covid19 test 6/13/2020    Problem: VIVI on CPAP  Assessment and Plan: Pt reports seldom use

## 2020-06-12 NOTE — H&P PST ADULT - NSICDXPASTSURGICALHX_GEN_ALL_CORE_FT
PAST SURGICAL HISTORY:  H/O bilateral oophorectomy age 41    H/O sinus surgery     H/O total knee replacement, right     H/O tubal ligation 1986    H/O ventral hernia repair 2018    S/P arthroscopic surgery of right knee x 3

## 2020-06-12 NOTE — H&P PST ADULT - NSICDXPASTMEDICALHX_GEN_ALL_CORE_FT
PAST MEDICAL HISTORY:  Sleep apnea     Umbilical hernia without obstruction and without gangrene     Unilateral primary osteoarthritis, right knee

## 2020-06-12 NOTE — H&P PST ADULT - HISTORY OF PRESENT ILLNESS
61y/o female with medical h/o VIVI on CPAP, c/o Left knee injury 1/2020 and presents today for PST for scheduled Left Knee Arthroscopy with Medial Meniscectomy on 6/17/2020

## 2020-06-13 LAB — SARS-COV-2 RNA SPEC QL NAA+PROBE: SIGNIFICANT CHANGE UP

## 2020-06-15 ENCOUNTER — APPOINTMENT (OUTPATIENT)
Dept: INTERNAL MEDICINE | Facility: CLINIC | Age: 60
End: 2020-06-15
Payer: COMMERCIAL

## 2020-06-15 VITALS
DIASTOLIC BLOOD PRESSURE: 75 MMHG | HEART RATE: 68 BPM | BODY MASS INDEX: 30.01 KG/M2 | OXYGEN SATURATION: 98 % | WEIGHT: 198 LBS | SYSTOLIC BLOOD PRESSURE: 127 MMHG | HEIGHT: 68 IN

## 2020-06-15 DIAGNOSIS — Z87.448 PERSONAL HISTORY OF OTHER DISEASES OF URINARY SYSTEM: ICD-10-CM

## 2020-06-15 DIAGNOSIS — R07.89 OTHER CHEST PAIN: ICD-10-CM

## 2020-06-15 PROCEDURE — 99214 OFFICE O/P EST MOD 30 MIN: CPT

## 2020-06-15 RX ORDER — RAMELTEON 8 MG/1
8 TABLET, FILM COATED ORAL
Qty: 30 | Refills: 6 | Status: DISCONTINUED | COMMUNITY
Start: 2018-12-13 | End: 2020-06-15

## 2020-06-15 NOTE — PHYSICAL EXAM
[No Acute Distress] : no acute distress [Well Developed] : well developed [Well Nourished] : well nourished [PERRL] : pupils equal round and reactive to light [Well-Appearing] : well-appearing [Normal Sclera/Conjunctiva] : normal sclera/conjunctiva [Normal Outer Ear/Nose] : the outer ears and nose were normal in appearance [Normal Oropharynx] : the oropharynx was normal [EOMI] : extraocular movements intact [No Lymphadenopathy] : no lymphadenopathy [No JVD] : no jugular venous distention [Thyroid Normal, No Nodules] : the thyroid was normal and there were no nodules present [No Respiratory Distress] : no respiratory distress  [Supple] : supple [Normal Rate] : normal rate  [Clear to Auscultation] : lungs were clear to auscultation bilaterally [No Accessory Muscle Use] : no accessory muscle use [Normal S1, S2] : normal S1 and S2 [No Murmur] : no murmur heard [Regular Rhythm] : with a regular rhythm [No Carotid Bruits] : no carotid bruits [No Abdominal Bruit] : a ~M bruit was not heard ~T in the abdomen [No Varicosities] : no varicosities [Pedal Pulses Present] : the pedal pulses are present [No Edema] : there was no peripheral edema [No Palpable Aorta] : no palpable aorta [Soft] : abdomen soft [No Extremity Clubbing/Cyanosis] : no extremity clubbing/cyanosis [Non Tender] : non-tender [Non-distended] : non-distended [No Masses] : no abdominal mass palpated [No HSM] : no HSM [Normal Posterior Cervical Nodes] : no posterior cervical lymphadenopathy [Normal Bowel Sounds] : normal bowel sounds [Normal Anterior Cervical Nodes] : no anterior cervical lymphadenopathy [No Spinal Tenderness] : no spinal tenderness [No CVA Tenderness] : no CVA  tenderness [No Rash] : no rash [No Joint Swelling] : no joint swelling [Grossly Normal Strength/Tone] : grossly normal strength/tone [No Focal Deficits] : no focal deficits [Coordination Grossly Intact] : coordination grossly intact [Normal Gait] : normal gait [Normal Affect] : the affect was normal [Deep Tendon Reflexes (DTR)] : deep tendon reflexes were 2+ and symmetric [Normal Insight/Judgement] : insight and judgment were intact

## 2020-06-15 NOTE — HISTORY OF PRESENT ILLNESS
[No Pertinent Cardiac History] : no history of aortic stenosis, atrial fibrillation, coronary artery disease, recent myocardial infarction, or implantable device/pacemaker [No Pertinent Pulmonary History] : no history of asthma, COPD, sleep apnea, or smoking [No Adverse Anesthesia Reaction] : no adverse anesthesia reaction in self or family member [FreeTextEntry3] : dr brittany cabrera [FreeTextEntry2] : jun 17, 2020. [FreeTextEntry1] : knee  arthroscopy [(Patient denies any chest pain, claudication, dyspnea on exertion, orthopnea, palpitations or syncope)] : Patient denies any chest pain, claudication, dyspnea on exertion, orthopnea, palpitations or syncope

## 2020-06-16 ENCOUNTER — TRANSCRIPTION ENCOUNTER (OUTPATIENT)
Age: 60
End: 2020-06-16

## 2020-06-16 LAB — SARS-COV-2 RNA SPEC QL NAA+PROBE: SIGNIFICANT CHANGE UP

## 2020-06-17 ENCOUNTER — OUTPATIENT (OUTPATIENT)
Dept: OUTPATIENT SERVICES | Facility: HOSPITAL | Age: 60
LOS: 1 days | Discharge: HOME | End: 2020-06-17

## 2020-06-17 VITALS
HEART RATE: 60 BPM | SYSTOLIC BLOOD PRESSURE: 126 MMHG | RESPIRATION RATE: 17 BRPM | TEMPERATURE: 98 F | OXYGEN SATURATION: 98 % | DIASTOLIC BLOOD PRESSURE: 70 MMHG

## 2020-06-17 VITALS
RESPIRATION RATE: 20 BRPM | OXYGEN SATURATION: 100 % | DIASTOLIC BLOOD PRESSURE: 84 MMHG | TEMPERATURE: 98 F | HEART RATE: 79 BPM | SYSTOLIC BLOOD PRESSURE: 129 MMHG | WEIGHT: 195.11 LBS | HEIGHT: 68 IN

## 2020-06-17 DIAGNOSIS — Z90.722 ACQUIRED ABSENCE OF OVARIES, BILATERAL: Chronic | ICD-10-CM

## 2020-06-17 DIAGNOSIS — Z98.51 TUBAL LIGATION STATUS: Chronic | ICD-10-CM

## 2020-06-17 DIAGNOSIS — Z98.890 OTHER SPECIFIED POSTPROCEDURAL STATES: Chronic | ICD-10-CM

## 2020-06-17 DIAGNOSIS — Z96.651 PRESENCE OF RIGHT ARTIFICIAL KNEE JOINT: Chronic | ICD-10-CM

## 2020-06-17 RX ORDER — HYDROMORPHONE HYDROCHLORIDE 2 MG/ML
1 INJECTION INTRAMUSCULAR; INTRAVENOUS; SUBCUTANEOUS
Refills: 0 | Status: DISCONTINUED | OUTPATIENT
Start: 2020-06-17 | End: 2020-06-17

## 2020-06-17 RX ORDER — ASPIRIN/CALCIUM CARB/MAGNESIUM 324 MG
1 TABLET ORAL
Qty: 60 | Refills: 0
Start: 2020-06-17 | End: 2020-07-16

## 2020-06-17 RX ORDER — HYDROMORPHONE HYDROCHLORIDE 2 MG/ML
0.5 INJECTION INTRAMUSCULAR; INTRAVENOUS; SUBCUTANEOUS
Refills: 0 | Status: DISCONTINUED | OUTPATIENT
Start: 2020-06-17 | End: 2020-06-17

## 2020-06-17 RX ORDER — SODIUM CHLORIDE 9 MG/ML
1000 INJECTION, SOLUTION INTRAVENOUS
Refills: 0 | Status: DISCONTINUED | OUTPATIENT
Start: 2020-06-17 | End: 2020-06-17

## 2020-06-17 RX ORDER — ONDANSETRON 8 MG/1
4 TABLET, FILM COATED ORAL ONCE
Refills: 0 | Status: DISCONTINUED | OUTPATIENT
Start: 2020-06-17 | End: 2020-06-17

## 2020-06-17 RX ADMIN — SODIUM CHLORIDE 125 MILLILITER(S): 9 INJECTION, SOLUTION INTRAVENOUS at 08:51

## 2020-06-17 RX ADMIN — SODIUM CHLORIDE 3 MILLILITER(S): 9 INJECTION INTRAMUSCULAR; INTRAVENOUS; SUBCUTANEOUS at 06:35

## 2020-06-17 RX ADMIN — HYDROMORPHONE HYDROCHLORIDE 0.5 MILLIGRAM(S): 2 INJECTION INTRAMUSCULAR; INTRAVENOUS; SUBCUTANEOUS at 08:50

## 2020-06-17 NOTE — ASU PATIENT PROFILE, ADULT - PMH
Sleep apnea    Umbilical hernia without obstruction and without gangrene    Unilateral primary osteoarthritis, right knee

## 2020-06-17 NOTE — ASU PATIENT PROFILE, ADULT - PSH
H/O bilateral oophorectomy  age 41  H/O sinus surgery    H/O total knee replacement, right    H/O tubal ligation  1986  H/O ventral hernia repair  2018  S/P arthroscopic surgery of right knee  x 3

## 2020-06-17 NOTE — BRIEF OPERATIVE NOTE - NSICDXBRIEFPROCEDURE_GEN_ALL_CORE_FT
PROCEDURES:  Left knee arthroscopy 17-Jun-2020 08:13:04 partial medial and lateral meniscectomy Nate Mckeon

## 2020-06-17 NOTE — BRIEF OPERATIVE NOTE - NSICDXBRIEFPOSTOP_GEN_ALL_CORE_FT
POST-OP DIAGNOSIS:  Arthritis of left knee 17-Jun-2020 08:14:42  Nate Mckeon  Complex tear of lateral meniscus of left knee 17-Jun-2020 08:14:00  Nate Mckeon  Complex tear of medial meniscus of left knee 17-Jun-2020 08:13:34  Nate Mckeon

## 2020-06-17 NOTE — ASU DISCHARGE PLAN (ADULT/PEDIATRIC) - CALL YOUR DOCTOR IF YOU HAVE ANY OF THE FOLLOWING:
Increased irritability or sluggishness/Pain not relieved by Medications/Bleeding that does not stop/Swelling that gets worse/Nausea and vomiting that does not stop/Unable to urinate/Excessive diarrhea/Numbness, tingling, color or temperature change to extremity/Inability to tolerate liquids or foods/Fever greater than (need to indicate Fahrenheit or Celsius)/Wound/Surgical Site with redness, or foul smelling discharge or pus

## 2020-06-21 DIAGNOSIS — M23.201 DERANGEMENT OF UNSPECIFIED LATERAL MENISCUS DUE TO OLD TEAR OR INJURY, LEFT KNEE: ICD-10-CM

## 2020-06-21 DIAGNOSIS — G47.33 OBSTRUCTIVE SLEEP APNEA (ADULT) (PEDIATRIC): ICD-10-CM

## 2020-06-21 DIAGNOSIS — M23.204 DERANGEMENT OF UNSPECIFIED MEDIAL MENISCUS DUE TO OLD TEAR OR INJURY, LEFT KNEE: ICD-10-CM

## 2020-06-21 DIAGNOSIS — M17.12 UNILATERAL PRIMARY OSTEOARTHRITIS, LEFT KNEE: ICD-10-CM

## 2020-06-21 DIAGNOSIS — Z98.51 TUBAL LIGATION STATUS: ICD-10-CM

## 2020-06-21 DIAGNOSIS — Z79.82 LONG TERM (CURRENT) USE OF ASPIRIN: ICD-10-CM

## 2020-06-21 DIAGNOSIS — Z96.651 PRESENCE OF RIGHT ARTIFICIAL KNEE JOINT: ICD-10-CM

## 2020-06-21 DIAGNOSIS — M94.262 CHONDROMALACIA, LEFT KNEE: ICD-10-CM

## 2020-06-21 DIAGNOSIS — Z99.89 DEPENDENCE ON OTHER ENABLING MACHINES AND DEVICES: ICD-10-CM

## 2020-06-21 DIAGNOSIS — Z90.722 ACQUIRED ABSENCE OF OVARIES, BILATERAL: ICD-10-CM

## 2020-06-21 DIAGNOSIS — Z88.0 ALLERGY STATUS TO PENICILLIN: ICD-10-CM

## 2020-07-15 ENCOUNTER — APPOINTMENT (OUTPATIENT)
Dept: INTERNAL MEDICINE | Facility: CLINIC | Age: 60
End: 2020-07-15
Payer: COMMERCIAL

## 2020-07-15 VITALS
OXYGEN SATURATION: 99 % | DIASTOLIC BLOOD PRESSURE: 78 MMHG | WEIGHT: 200 LBS | SYSTOLIC BLOOD PRESSURE: 130 MMHG | BODY MASS INDEX: 30.31 KG/M2 | HEIGHT: 68 IN | HEART RATE: 69 BPM

## 2020-07-15 VITALS — DIASTOLIC BLOOD PRESSURE: 80 MMHG | SYSTOLIC BLOOD PRESSURE: 122 MMHG

## 2020-07-15 DIAGNOSIS — Z00.00 ENCOUNTER FOR GENERAL ADULT MEDICAL EXAMINATION W/OUT ABNORMAL FINDINGS: ICD-10-CM

## 2020-07-15 DIAGNOSIS — K21.9 GASTRO-ESOPHAGEAL REFLUX DISEASE W/OUT ESOPHAGITIS: ICD-10-CM

## 2020-07-15 DIAGNOSIS — Z11.59 ENCOUNTER FOR SCREENING FOR OTHER VIRAL DISEASES: ICD-10-CM

## 2020-07-15 LAB
BILIRUB UR QL STRIP: NORMAL
CLARITY UR: CLEAR
COLLECTION METHOD: NORMAL
GLUCOSE UR-MCNC: NORMAL
HCG UR QL: 0.2 EU/DL
HGB UR QL STRIP.AUTO: NORMAL
KETONES UR-MCNC: NORMAL
LEUKOCYTE ESTERASE UR QL STRIP: NORMAL
NITRITE UR QL STRIP: NORMAL
PH UR STRIP: 6
PROT UR STRIP-MCNC: NORMAL
SP GR UR STRIP: 1.02

## 2020-07-15 PROCEDURE — 36415 COLL VENOUS BLD VENIPUNCTURE: CPT

## 2020-07-15 PROCEDURE — 81003 URINALYSIS AUTO W/O SCOPE: CPT | Mod: QW

## 2020-07-15 PROCEDURE — G0444 DEPRESSION SCREEN ANNUAL: CPT

## 2020-07-15 PROCEDURE — 93000 ELECTROCARDIOGRAM COMPLETE: CPT | Mod: 59

## 2020-07-15 PROCEDURE — 99396 PREV VISIT EST AGE 40-64: CPT | Mod: 25

## 2020-07-15 NOTE — HEALTH RISK ASSESSMENT
[Good] : ~his/her~  mood as  good [] : No [No] : No [0] : 2) Feeling down, depressed, or hopeless: Not at all (0) [de-identified] : needs more exercise [Patient reported mammogram was normal] : Patient reported mammogram was normal [Patient reported PAP Smear was normal] : Patient reported PAP Smear was normal [Patient reported colonoscopy was normal] : Patient reported colonoscopy was normal [Patient reported bone density results were normal] : Patient reported bone density results were normal [None] : None [Reports changes in vision] : Reports no changes in vision [Reports changes in hearing] : Reports no changes in hearing [Reports changes in dental health] : Reports no changes in dental health [PapSmearDate] : 2018 [MammogramDate] : 2018 [ColonoscopyDate] : 2017

## 2020-07-16 LAB
ALBUMIN SERPL ELPH-MCNC: 4.6 G/DL
ALP BLD-CCNC: 84 U/L
ALT SERPL-CCNC: 15 U/L
ANION GAP SERPL CALC-SCNC: 15 MMOL/L
AST SERPL-CCNC: 19 U/L
BILIRUB SERPL-MCNC: 0.5 MG/DL
BUN SERPL-MCNC: 13 MG/DL
CALCIUM SERPL-MCNC: 10.2 MG/DL
CHLORIDE SERPL-SCNC: 104 MMOL/L
CHOLEST SERPL-MCNC: 249 MG/DL
CHOLEST/HDLC SERPL: 3.3 RATIO
CO2 SERPL-SCNC: 24 MMOL/L
CREAT SERPL-MCNC: 0.84 MG/DL
GLUCOSE SERPL-MCNC: 93 MG/DL
HDLC SERPL-MCNC: 75 MG/DL
LDLC SERPL CALC-MCNC: 153 MG/DL
POTASSIUM SERPL-SCNC: 4.8 MMOL/L
PROT SERPL-MCNC: 7.3 G/DL
SARS-COV-2 IGG SERPL IA-ACNC: 0 INDEX
SARS-COV-2 IGG SERPL QL IA: NEGATIVE
SODIUM SERPL-SCNC: 143 MMOL/L
TRIGL SERPL-MCNC: 106 MG/DL

## 2020-11-27 ENCOUNTER — OUTPATIENT (OUTPATIENT)
Dept: OUTPATIENT SERVICES | Facility: HOSPITAL | Age: 60
LOS: 1 days | Discharge: HOME | End: 2020-11-27
Payer: COMMERCIAL

## 2020-11-27 VITALS
TEMPERATURE: 98 F | OXYGEN SATURATION: 100 % | SYSTOLIC BLOOD PRESSURE: 151 MMHG | WEIGHT: 203.93 LBS | HEART RATE: 75 BPM | RESPIRATION RATE: 16 BRPM | DIASTOLIC BLOOD PRESSURE: 89 MMHG | HEIGHT: 66 IN

## 2020-11-27 DIAGNOSIS — Z96.651 PRESENCE OF RIGHT ARTIFICIAL KNEE JOINT: Chronic | ICD-10-CM

## 2020-11-27 DIAGNOSIS — G47.33 OBSTRUCTIVE SLEEP APNEA (ADULT) (PEDIATRIC): ICD-10-CM

## 2020-11-27 DIAGNOSIS — K21.9 GASTRO-ESOPHAGEAL REFLUX DISEASE WITHOUT ESOPHAGITIS: ICD-10-CM

## 2020-11-27 DIAGNOSIS — Z98.890 OTHER SPECIFIED POSTPROCEDURAL STATES: Chronic | ICD-10-CM

## 2020-11-27 DIAGNOSIS — M17.12 UNILATERAL PRIMARY OSTEOARTHRITIS, LEFT KNEE: ICD-10-CM

## 2020-11-27 DIAGNOSIS — Z01.818 ENCOUNTER FOR OTHER PREPROCEDURAL EXAMINATION: ICD-10-CM

## 2020-11-27 DIAGNOSIS — Z90.722 ACQUIRED ABSENCE OF OVARIES, BILATERAL: Chronic | ICD-10-CM

## 2020-11-27 DIAGNOSIS — Z98.51 TUBAL LIGATION STATUS: Chronic | ICD-10-CM

## 2020-11-27 LAB
A1C WITH ESTIMATED AVERAGE GLUCOSE RESULT: 5.8 % — HIGH (ref 4–5.6)
ANION GAP SERPL CALC-SCNC: 6 MMOL/L — SIGNIFICANT CHANGE UP (ref 5–17)
APTT BLD: 30.6 SEC — SIGNIFICANT CHANGE UP (ref 27.5–35.5)
BASOPHILS # BLD AUTO: 0.05 K/UL — SIGNIFICANT CHANGE UP (ref 0–0.2)
BASOPHILS NFR BLD AUTO: 0.7 % — SIGNIFICANT CHANGE UP (ref 0–2)
BLD GP AB SCN SERPL QL: SIGNIFICANT CHANGE UP
BUN SERPL-MCNC: 15 MG/DL — SIGNIFICANT CHANGE UP (ref 7–23)
CALCIUM SERPL-MCNC: 9.7 MG/DL — SIGNIFICANT CHANGE UP (ref 8.5–10.1)
CHLORIDE SERPL-SCNC: 108 MMOL/L — SIGNIFICANT CHANGE UP (ref 96–108)
CO2 SERPL-SCNC: 27 MMOL/L — SIGNIFICANT CHANGE UP (ref 22–31)
CREAT SERPL-MCNC: 0.92 MG/DL — SIGNIFICANT CHANGE UP (ref 0.5–1.3)
EOSINOPHIL # BLD AUTO: 0.15 K/UL — SIGNIFICANT CHANGE UP (ref 0–0.5)
EOSINOPHIL NFR BLD AUTO: 2.2 % — SIGNIFICANT CHANGE UP (ref 0–6)
ESTIMATED AVERAGE GLUCOSE: 120 MG/DL — HIGH (ref 68–114)
GLUCOSE SERPL-MCNC: 91 MG/DL — SIGNIFICANT CHANGE UP (ref 70–99)
HCT VFR BLD CALC: 43.7 % — SIGNIFICANT CHANGE UP (ref 34.5–45)
HGB BLD-MCNC: 13.9 G/DL — SIGNIFICANT CHANGE UP (ref 11.5–15.5)
IMM GRANULOCYTES NFR BLD AUTO: 0.3 % — SIGNIFICANT CHANGE UP (ref 0–1.5)
INR BLD: 1 RATIO — SIGNIFICANT CHANGE UP (ref 0.88–1.16)
LYMPHOCYTES # BLD AUTO: 1.87 K/UL — SIGNIFICANT CHANGE UP (ref 1–3.3)
LYMPHOCYTES # BLD AUTO: 27.4 % — SIGNIFICANT CHANGE UP (ref 13–44)
MCHC RBC-ENTMCNC: 29 PG — SIGNIFICANT CHANGE UP (ref 27–34)
MCHC RBC-ENTMCNC: 31.8 GM/DL — LOW (ref 32–36)
MCV RBC AUTO: 91 FL — SIGNIFICANT CHANGE UP (ref 80–100)
MONOCYTES # BLD AUTO: 0.7 K/UL — SIGNIFICANT CHANGE UP (ref 0–0.9)
MONOCYTES NFR BLD AUTO: 10.3 % — SIGNIFICANT CHANGE UP (ref 2–14)
MRSA PCR RESULT.: SIGNIFICANT CHANGE UP
NEUTROPHILS # BLD AUTO: 4.03 K/UL — SIGNIFICANT CHANGE UP (ref 1.8–7.4)
NEUTROPHILS NFR BLD AUTO: 59.1 % — SIGNIFICANT CHANGE UP (ref 43–77)
NRBC # BLD: 0 /100 WBCS — SIGNIFICANT CHANGE UP (ref 0–0)
PLATELET # BLD AUTO: 265 K/UL — SIGNIFICANT CHANGE UP (ref 150–400)
POTASSIUM SERPL-MCNC: 4 MMOL/L — SIGNIFICANT CHANGE UP (ref 3.5–5.3)
POTASSIUM SERPL-SCNC: 4 MMOL/L — SIGNIFICANT CHANGE UP (ref 3.5–5.3)
PROTHROM AB SERPL-ACNC: 11.6 SEC — SIGNIFICANT CHANGE UP (ref 10.6–13.6)
RBC # BLD: 4.8 M/UL — SIGNIFICANT CHANGE UP (ref 3.8–5.2)
RBC # FLD: 13.4 % — SIGNIFICANT CHANGE UP (ref 10.3–14.5)
S AUREUS DNA NOSE QL NAA+PROBE: SIGNIFICANT CHANGE UP
SODIUM SERPL-SCNC: 141 MMOL/L — SIGNIFICANT CHANGE UP (ref 135–145)
WBC # BLD: 6.82 K/UL — SIGNIFICANT CHANGE UP (ref 3.8–10.5)
WBC # FLD AUTO: 6.82 K/UL — SIGNIFICANT CHANGE UP (ref 3.8–10.5)

## 2020-11-27 PROCEDURE — 93010 ELECTROCARDIOGRAM REPORT: CPT

## 2020-11-27 NOTE — H&P PST ADULT - NEGATIVE MUSCULOSKELETAL SYMPTOMS
no myalgia/no muscle cramps/no arthralgia/no leg pain R/no back pain/no joint swelling/no muscle weakness/no neck pain/no arm pain L/no arm pain R

## 2020-11-27 NOTE — H&P PST ADULT - NEGATIVE ALLERGY TYPES
no indoor environmental allergies/no reactions to animals/no outdoor environmental allergies/no reactions to food

## 2020-11-27 NOTE — OCCUPATIONAL THERAPY INITIAL EVALUATION ADULT - ADDITIONAL COMMENTS
Patient lives with spouse and child (Who can assist post op) in a private house with 5 steps to enter with bilateral handrails that are far apart. Once inside, the patient main bedroom and bathroom is on that floor when entering. The patient bathroom has a walk in shower stall, retractable shower head, standard toilet seat and no grab bars. The patient has a 3/1 commode, but does not use it. The patient ambulates with no device and owns a rolling walker, cane and crutches. The patient daily pain is a 2-3/10 at rest and a 8/10 with movement. The patient manages the pain with rest and does not take pain medications. The patient had recent outpatient PT (1 month ago), fall hx (september 2019) and has no buckling of the legs. The patient wears glasses all the time, R handed, drives and has no hearing impairments.

## 2020-11-27 NOTE — H&P PST ADULT - HISTORY OF PRESENT ILLNESS
61y/o female with medical h/o GERD, VIVI on CPAP and OA, left knee, presents today for PST for scheduled Left Total Knee Replacement scheduled for 12/10/2020

## 2020-11-27 NOTE — PHYSICAL THERAPY INITIAL EVALUATION ADULT - GENERAL OBSERVATIONS, REHAB EVAL
Patient encountered sitting in PST waiting area, agreeable to PT pre-op evaluation. Patient is for elective left total knee arthroplasty at a later date from now. Tolerated all aspects of evaluation without undue events, please see further PT documentation for details.

## 2020-11-27 NOTE — H&P PST ADULT - NSICDXPROBLEM_GEN_ALL_CORE_FT
PROBLEM DIAGNOSES  Problem: Unilateral primary osteoarthritis, left knee  Assessment and Plan: Pre-op instructions given. Pt verbalized understanding  Chlorhexidine wash + Ensure clear instructions given  Pending: M/C for abnormal EKG + Covidpcr test/result    Problem: VIVI (obstructive sleep apnea)  Assessment and Plan: denies CPAP use    Problem: GERD (gastroesophageal reflux disease)  Assessment and Plan: Pt instructed to take meds

## 2020-11-27 NOTE — PHYSICAL THERAPY INITIAL EVALUATION ADULT - REFERRAL TO ANOTHER SERVICE NEEDED, PT EVAL
6/8/2020       RE: Stacey La  [id number]  [facility] [address]     Dear Colleague,    Thank you for referring your patient, Stacey La, to the [facility] ENDOCRINOLOGY at [facility]. Please see a copy of my visit note below.    This visit was converted from an in person visit to a virtual visit due to the ongoing COVID-19 pandemic.    Start time: 1014  End time: 1035    HPI:  Stacey is a 26 yo woman here for follow up of type 1 diabetes, diagnosed at age 8.   She also sees Dr. Allan.  Stacey's diabetes is complicated by nephropathy. She also has hyperlipidemia, a history of hypothyroidism (although she has been off of levothyroxine for several years) and celiac disease.  She follows a strict gluten free diet. She continues wearing a Dexcom G6 sensor and she really likes it.  She feels the accuracy is much better.     She uses the T-slim pump with the integrated sensor.  She likes this. She has been having very little hypoglycemia. She upgraded to control IQ and feels this has been very helpful.      Stacey tests her blood sugar 4 times daily with an overall average this month of 167 mg/dL on her sensor.            Stacey wears a t-slim pump with pump settings as follows:             For her hypothyroidism- untreated with normal TSH.      For her CKD (stage 1), she is taking enalapril. She follows with nephrology.  She understands she will need to stop this if she were to become pregnant.    Satcey has been mostly working from home.  She does food delivery for students on Mondays.  She is wearing a mask and trying to be as socially distant as possible.  She has not concerns today.       ROS  GENERAL: no weight loss, weight gain, fevers, chills, malaise, night sweats.   HEENT: no dysphagia, diplopia, neck pain or tenderness, dry/scratchy eyes, URI, cough, sinus drainage, tinnitus, sinus pressure  CV: no chest pain, pressure, palpitations, skipped beats,  LOC  LUNGS: no SOB, CHILDRESS, cough, sputum production, wheezing   GI: no diarrhea, constipation, abdominal pain  EXTREMITIES: no rashes, ulcers, edema  NEUROLOGY: no changes in vision, tingling or numbness in hands or feet.   MSK: no muscle aches or pains, weakness  PSYCH: mood stable    Past Medical History:   Diagnosis Date     Asthma     Currently no treatment needed     Celiac disease      Chronic kidney disease, stage I 8/3/2015     Chronic sinusitis      Diabetes mellitus type 1 (H)      Diabetic nephropathy (H)      Family history of heart disease 8/3/2015     Hypertension      Hypothyroid      Pedal edema      Psoriasis      PMH:   Type 1 diabetes- since 2003  Celiac disease- since age 14  Hypothyroidism- age 11 (no longer on levothyroxine for several years)  Hyperlipidemia- had been on a statin for a little more than a year, mostly eating a plant based diet now.    Past Surgical History:   Procedure Laterality Date     ENT SURGERY       TONSILLECTOMY, ADENOIDECTOMY, COMBINED         Family History   Problem Relation Age of Onset     Cardiovascular Father 48        MI, stents, diabetic, type 2     Diabetes Father      Coronary Artery Disease Father      Obesity Father      Gastrointestinal Disease Mother         Celiacs     Obesity Mother      Heart Disease Paternal Half-Brother 45     Cardiovascular Maternal Grandfather         Englarged heart, pacemaker, defib     Obesity Maternal Half-Sister      Family Hx:   Dad- premature CVD, in his 40s.  Type 2 diabetes  Mom- celiac disease  Grandfather- type 2 diabetes  Half brother- Asperger's  Half sister- cervical CA  Another half brother- bipolar and schizophrenia      History     Social History     Marital Status:      Spouse Name: N/A     Number of Children: N/A     Years of Education: N/A     Social History Main Topics     Smoking status: Never Smoker      Smokeless tobacco: Never Used     Alcohol Use: Yes      Comment: occ. use     Drug Use: No     Sexual  Activity:     Partners: Male     Birth Control/ Protection: Condom     Other Topics Concern     Parent/Sibling W/ Cabg, Mi Or Angioplasty Before 65f 55m? Yes     Caffeine Concern Yes     1 cup tea per day     Sleep Concern No     Special Diet Yes     gluten free diet, low carbs     Exercise Yes     walking, ellyptical, swimming, weights     Seat Belt Yes     Social History Narrative     Social Hx:   . 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  Had previously worked as a nanny. She also goes to the gym a few days a week.  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     Current Outpatient Medications   Medication     albuterol (PROAIR HFA/PROVENTIL HFA/VENTOLIN HFA) 108 (90 Base) MCG/ACT inhaler     albuterol (PROVENTIL) (2.5 MG/3ML) 0.083% neb solution     Ascorbic Acid (VITAMIN C PO)     ASPIRIN NOT PRESCRIBED (INTENTIONAL)     blood glucose test strip     Blood Pressure Monitoring KIT     Cholecalciferol 2000 UNITS TBDP     enalapril (VASOTEC) 20 MG tablet     escitalopram (LEXAPRO) 10 MG tablet     glucagon (GLUCAGON EMERGENCY) 1 MG kit     hydrOXYzine (ATARAX) 25 MG tablet     Insulin Infusion Pump Supplies (INSULIN PUMP SYRINGE RESERVOIR) MISC     insulin lispro (HUMALOG VIAL) 100 UNIT/ML vial     insulin pen needle (B-D U/F) 31G X 5 MM miscellaneous     Insulin Pump Accessories MISC     insulin syringes, disposable, U-100 0.3 ML MISC     ipratropium - albuterol 0.5 mg/2.5 mg/3 mL (DUONEB) 0.5-2.5 (3) MG/3ML neb solution     liraglutide (VICTOZA PEN) 18 MG/3ML soln     Multiple Vitamins-Minerals (MULTIVITAMIN ADULT PO)     Ondansetron HCl (ZOFRAN PO)     STATIN NOT PRESCRIBED (INTENTIONAL)     No current facility-administered medications for this visit.           Allergies   Allergen Reactions     Dogs Other (See Comments)     Sinus symptoms      Dust Mites      Sinus      Gluten Meal      Physical Exam  There were no vitals taken for this visit.  GENERAL:  Alert and oriented X3,  NAD    RESULTS  Lab Results   Component Value Date    A1C 7.3 (H) 02/17/2020    A1C 7.0 (H) 01/17/2018    A1C 7.6 (H) 03/26/2015    HEMOGLOBINA1 7.0 (A) 01/28/2020    HEMOGLOBINA1 7.3 (A) 08/19/2019    HEMOGLOBINA1 6.8 (A) 05/14/2019    HEMOGLOBINA1 6.8 (A) 02/18/2019    HEMOGLOBINA1 7.7 (A) 11/13/2018       TSH   Date Value Ref Range Status   02/17/2020 3.97 0.40 - 4.00 mU/L Final   06/07/2019 3.91 0.40 - 4.00 mU/L Final   03/04/2019 3.41 0.40 - 4.00 mU/L Final   10/10/2016 2.89 0.40 - 4.00 mU/L Final   01/16/2016 3.33 0.40 - 4.00 mU/L Final     T4 Free   Date Value Ref Range Status   12/09/2013 1.18 0.70 - 1.85 ng/dL Final       ALT   Date Value Ref Range Status   05/17/2019 33 0 - 50 U/L Final   02/20/2016 26 0 - 50 U/L Final   ]    Recent Labs   Lab Test 06/07/19  0858 03/04/19  0854 08/04/15  1443 12/09/13  1137   CHOL 195 189 194 241*   HDL 55 58 66 66   * 106* 102 142*   TRIG 128 126 131 165*   CHOLHDLRATIO  --   --  2.9 3.7       Lab Results   Component Value Date     05/19/2020      Lab Results   Component Value Date    POTASSIUM 4.3 05/19/2020     Lab Results   Component Value Date    CHLORIDE 104 05/19/2020     Lab Results   Component Value Date    FRANKLIN 9.3 05/19/2020     Lab Results   Component Value Date    CO2 22 05/19/2020     Lab Results   Component Value Date    BUN 9 05/19/2020     Lab Results   Component Value Date    CR 0.49 05/19/2020       GFR Estimate   Date Value Ref Range Status   05/19/2020 >90 >60 mL/min/[1.73_m2] Final     Comment:     Non  GFR Calc  Starting 12/18/2018, serum creatinine based estimated GFR (eGFR) will be   calculated using the Chronic Kidney Disease Epidemiology Collaboration   (CKD-EPI) equation.     05/17/2019 >90 >60 mL/min/[1.73_m2] Final     Comment:     Non  GFR Calc  Starting 12/18/2018, serum creatinine based estimated GFR (eGFR) will be   calculated using the Chronic Kidney Disease Epidemiology Collaboration  "  (CKD-EPI) equation.     05/13/2019 >90 >60 mL/min/[1.73_m2] Final     Comment:     Non  GFR Calc  Starting 12/18/2018, serum creatinine based estimated GFR (eGFR) will be   calculated using the Chronic Kidney Disease Epidemiology Collaboration   (CKD-EPI) equation.       GFR Estimate If Black   Date Value Ref Range Status   05/19/2020 >90 >60 mL/min/[1.73_m2] Final     Comment:      GFR Calc  Starting 12/18/2018, serum creatinine based estimated GFR (eGFR) will be   calculated using the Chronic Kidney Disease Epidemiology Collaboration   (CKD-EPI) equation.     05/17/2019 >90 >60 mL/min/[1.73_m2] Final     Comment:      GFR Calc  Starting 12/18/2018, serum creatinine based estimated GFR (eGFR) will be   calculated using the Chronic Kidney Disease Epidemiology Collaboration   (CKD-EPI) equation.     05/13/2019 >90 >60 mL/min/[1.73_m2] Final     Comment:      GFR Calc  Starting 12/18/2018, serum creatinine based estimated GFR (eGFR) will be   calculated using the Chronic Kidney Disease Epidemiology Collaboration   (CKD-EPI) equation.         Lab Results   Component Value Date    MICROL <5 05/19/2020     No results found for: MICROALBUMIN  No results found for: CPEPT, GADAB, ISCAB    No results found for: B12]    Most recent eye exam date: : Not Found     Assessment/Plan:     1.  Type 1 diabetes- Stacey is doing well and likes control IQ.  She tends to be too high overnight and is receiving multiple corrections that are not bringing her down enough.  Advised she use \"sleep\" mode overnight and will also increase her overnight basal.  She is also climbing too hihg post-lunch, so will tighten IC ratio.  We made the following changes today (instructions given to patient):   Control IQ is having to correct you a lot in the night. It appears you are not using \"sleep\" mode in the night.  I would suggest putting it into \"sleep\" mode (targets glucose at 100, but " does not give you automatic boluses in the night) before you go to bed, and also change basal rates as follows:   Mid- 2.8 (was 2.6)    You tend to go highest after lunch.  Please change insulin:carb ratio to:   Noon: 1/4.5g (was 1/5g)  Discussed COVID-19 precautions and increased incidence of DKA.  Will send rx for ketone strips.   The American Diabetes Association has issued a nice video with checklist for patients with diabetes: https://www.diabetes.org/diabetes/treatment-care/planning-sick-days/coronavirus    2.  Risk factors-     Retinopathy:  No.  Had eye exam within last year.   Nephropathy:  BP historically well controlled. No microalbuminuria.  Creatinine stable.  She is on enalapril.  White coat hypertension.     She follows with nephrology. Ordered a home BP cuff for her to check.   Neuropathy: No.    Feet: OK, no ulcers.   Lipids:  .  Had been on pravastatin 20 mg daily.  She stopped statin in anticipation of pregnancy.  She does not want to resume statin at this time.  Will recheck cholesterol.     3.  Hypothyroidism- last TSH in target on no levothyroxine.      4.  F/U in 3 months with Dr. Allan.     I spent 21 minutes with this patient on the phone and explained the conditions and plans (more than 50% of time was counseling/coordination of care, diabetes management, follow up plan for worsening hyper and hypoglycemia) . The patient understood and is satisfied with today's visit.     Laureen Goldstein PA-C, MPAS   [facility]  Department of Medicine  Division of Endocrinology and Diabetes           occupational therapy

## 2020-11-27 NOTE — H&P PST ADULT - NEGATIVE GASTROINTESTINAL SYMPTOMS
no vomiting/no flatulence/no abdominal pain/no hiccoughs/no diarrhea/no nausea/no constipation/no melena/no jaundice

## 2020-11-27 NOTE — PHYSICAL THERAPY INITIAL EVALUATION ADULT - CRITERIA FOR SKILLED THERAPEUTIC INTERVENTIONS
anticipated equipment needs at discharge/risk reduction/prevention/functional limitations in following categories/impairments found/anticipated discharge recommendation

## 2020-11-27 NOTE — PHYSICAL THERAPY INITIAL EVALUATION ADULT - PERTINENT HX OF CURRENT PROBLEM, REHAB EVAL
Patient attends Pre-Op Testing today following consult with Dr. Goodman due to chronic pain to left knee. Significant surgical/medical histories of elective RTKA. LTKA is now scheduled in this facility for 12/10/20.

## 2020-11-27 NOTE — H&P PST ADULT - NSICDXPASTMEDICALHX_GEN_ALL_CORE_FT
PAST MEDICAL HISTORY:  GERD (gastroesophageal reflux disease)     Knee osteoarthritis left    Sleep apnea     Umbilical hernia without obstruction and without gangrene     Unilateral primary osteoarthritis, right knee

## 2020-11-27 NOTE — H&P PST ADULT - NS NEC GEN PE MLT EXAM PC
1) This is most likely secondary to the tapering of his pain medication and will get better with time.  2) Brain MRI for follow-up of abnormality on CT scan of brain 9/2018 (probably, benign, noted on prior CT's). May try Excedrin migraine.  3)No further evaluation necessary.  
Discussed below recommendations with DONA Murphy at [facility].  She will discuss with patient and order Excedrine Migraine prn.   Katherine asks that we order MRI of Brain.        MRI of brain without contrast ordered    
Left message for DONA Murphy at [facility] to return call to clinic regarding below information.       
Phone call from DONA Murphy at [facility].     Katherine calls office to update Dr. Doll that patient had previously \"fired her\" as she and the aptient have had their differences and do not see eye to eye.  This am, however, patient requested to see Katherine for evaluation.   Katherine states Deep had numerous vague concerns.      1)Patient states he is experiencing hot and cold flashes which are upsetting to him.     2)Pt states he has had a constant headache and head pulsing for the past 6 months -rating pain at \"8/10  - 24 hours  A day\".    Maybe try Excedrine Migraine prn?      3) Patient states he is bothered by his left axilla. Stating his left axilla is a different shape than his right axilla, and states his left axilla pulls at his shoulder.   Katherine did exam, states patient does have some excess skin on several areas of his body due to weight loss, but no patient has no abnormalities noted on exam.  Katherine offered PT for patient, which he declined.        Katherine is aware that patient just saw Dr. Doll 10/29/19, and that he is out of the office until 11/14/19.  NP states that this are NOT urgent messages, as patient has had these complaints for numerous months.  States patient Is manipulative of staff and demanding.  Notified that patient was advised to f/u in 3 months, and this appt has not been set up.   
No bruits; no thyromegaly or nodules

## 2020-11-27 NOTE — H&P PST ADULT - NSICDXPASTSURGICALHX_GEN_ALL_CORE_FT
PAST SURGICAL HISTORY:  H/O arthroscopy left knee, 6/2020    H/O bilateral oophorectomy age 41    H/O sinus surgery     H/O total knee replacement, right     H/O tubal ligation 1986    H/O ventral hernia repair 2018    S/P arthroscopic surgery of right knee x 3

## 2020-11-27 NOTE — PHYSICAL THERAPY INITIAL EVALUATION ADULT - MODIFIED CLINICAL TEST OF SENSORY INTEGRATION IN BALANCE TEST
30 second Sit to Stand Test: 11 reps. Functional assessment of lower extremity strength and ability to maintain balance in standing, discriminates those with low and high levels of functional activity. One Leg Stand Test (OLST): Left: 20 seconds. Functional test to assess fall risk. Both gait and stair negotiation require components of OLST. Participants unable to perform the OLST for at least 5 seconds are at increased risk for injurious fall.

## 2020-11-27 NOTE — PHYSICAL THERAPY INITIAL EVALUATION ADULT - ADDITIONAL COMMENTS
Patient endorses typical pain at best is 2/10, at worst is 8/10. Per patient, pain is worse with changing positions in bed; sit to stand transitions. Pain is relieved by resting. Home set-up: lives with spouse and daughter in private house with 5 stair steps with x2 handrails wide apart to enter at front. All amenities at same level once inside. Bathroom is a step-in shower without grab rails, with a standard height toilet seat, with retractable shower head. Endorses will be supported by family post-op. Patient is currently independent with mobility. Reports owns a rolling walker, cane and commode. Patient is right-handed and drives; wears glasses for reading. Patient denies falls in past 6 months. Denies buckling.

## 2020-11-27 NOTE — H&P PST ADULT - MUSCULOSKELETAL
details… detailed exam decreased ROM due to pain/normal strength/no joint swelling/no joint erythema/no calf tenderness/no joint warmth

## 2020-11-27 NOTE — PHYSICAL THERAPY INITIAL EVALUATION ADULT - DISCHARGE DISPOSITION, PT EVAL
Home c home PT, pending functional status post-operatively.. Patient already owns a cane, rolling walker, 3:1 commode.

## 2020-12-01 ENCOUNTER — APPOINTMENT (OUTPATIENT)
Dept: INTERNAL MEDICINE | Facility: CLINIC | Age: 60
End: 2020-12-01
Payer: COMMERCIAL

## 2020-12-01 VITALS
HEART RATE: 70 BPM | DIASTOLIC BLOOD PRESSURE: 74 MMHG | SYSTOLIC BLOOD PRESSURE: 135 MMHG | OXYGEN SATURATION: 98 % | WEIGHT: 195 LBS | BODY MASS INDEX: 29.55 KG/M2 | HEIGHT: 68 IN

## 2020-12-01 DIAGNOSIS — Z23 ENCOUNTER FOR IMMUNIZATION: ICD-10-CM

## 2020-12-01 DIAGNOSIS — M17.10 UNILATERAL PRIMARY OSTEOARTHRITIS, UNSPECIFIED KNEE: ICD-10-CM

## 2020-12-01 DIAGNOSIS — Z01.818 ENCOUNTER FOR OTHER PREPROCEDURAL EXAMINATION: ICD-10-CM

## 2020-12-01 PROBLEM — K21.9 GASTRO-ESOPHAGEAL REFLUX DISEASE WITHOUT ESOPHAGITIS: Chronic | Status: ACTIVE | Noted: 2020-11-27

## 2020-12-01 PROCEDURE — 99214 OFFICE O/P EST MOD 30 MIN: CPT | Mod: 25

## 2020-12-01 PROCEDURE — 99072 ADDL SUPL MATRL&STAF TM PHE: CPT

## 2020-12-01 PROCEDURE — G0008: CPT

## 2020-12-01 PROCEDURE — 90686 IIV4 VACC NO PRSV 0.5 ML IM: CPT

## 2020-12-06 DIAGNOSIS — Z01.818 ENCOUNTER FOR OTHER PREPROCEDURAL EXAMINATION: ICD-10-CM

## 2020-12-07 ENCOUNTER — APPOINTMENT (OUTPATIENT)
Dept: DISASTER EMERGENCY | Facility: CLINIC | Age: 60
End: 2020-12-07

## 2020-12-08 LAB — SARS-COV-2 N GENE NPH QL NAA+PROBE: NOT DETECTED

## 2020-12-09 ENCOUNTER — TRANSCRIPTION ENCOUNTER (OUTPATIENT)
Age: 60
End: 2020-12-09

## 2020-12-09 NOTE — HISTORY OF PRESENT ILLNESS
[No Pertinent Cardiac History] : no history of aortic stenosis, atrial fibrillation, coronary artery disease, recent myocardial infarction, or implantable device/pacemaker [No Pertinent Pulmonary History] : no history of asthma, COPD, sleep apnea, or smoking [(Patient denies any chest pain, claudication, dyspnea on exertion, orthopnea, palpitations or syncope)] : Patient denies any chest pain, claudication, dyspnea on exertion, orthopnea, palpitations or syncope [Chronic Anticoagulation] : no chronic anticoagulation [Diabetes] : no diabetes [FreeTextEntry1] : total knee replacement [FreeTextEntry2] : december 10, 2020. [FreeTextEntry3] : dr brittany cabrera

## 2020-12-10 ENCOUNTER — TRANSCRIPTION ENCOUNTER (OUTPATIENT)
Age: 60
End: 2020-12-10

## 2020-12-10 ENCOUNTER — RESULT REVIEW (OUTPATIENT)
Age: 60
End: 2020-12-10

## 2020-12-10 ENCOUNTER — INPATIENT (INPATIENT)
Facility: HOSPITAL | Age: 60
LOS: 0 days | Discharge: HOME | End: 2020-12-11
Attending: ORTHOPAEDIC SURGERY | Admitting: ORTHOPAEDIC SURGERY
Payer: COMMERCIAL

## 2020-12-10 VITALS
HEIGHT: 66 IN | TEMPERATURE: 98 F | RESPIRATION RATE: 20 BRPM | HEART RATE: 74 BPM | WEIGHT: 195.11 LBS | DIASTOLIC BLOOD PRESSURE: 78 MMHG | OXYGEN SATURATION: 96 % | SYSTOLIC BLOOD PRESSURE: 131 MMHG

## 2020-12-10 DIAGNOSIS — Z98.890 OTHER SPECIFIED POSTPROCEDURAL STATES: Chronic | ICD-10-CM

## 2020-12-10 DIAGNOSIS — Z96.651 PRESENCE OF RIGHT ARTIFICIAL KNEE JOINT: Chronic | ICD-10-CM

## 2020-12-10 DIAGNOSIS — Z98.51 TUBAL LIGATION STATUS: Chronic | ICD-10-CM

## 2020-12-10 DIAGNOSIS — Z90.722 ACQUIRED ABSENCE OF OVARIES, BILATERAL: Chronic | ICD-10-CM

## 2020-12-10 LAB
ANION GAP SERPL CALC-SCNC: 8 MMOL/L — SIGNIFICANT CHANGE UP (ref 5–17)
BUN SERPL-MCNC: 19 MG/DL — SIGNIFICANT CHANGE UP (ref 7–23)
CALCIUM SERPL-MCNC: 9.1 MG/DL — SIGNIFICANT CHANGE UP (ref 8.5–10.1)
CHLORIDE SERPL-SCNC: 110 MMOL/L — HIGH (ref 96–108)
CO2 SERPL-SCNC: 24 MMOL/L — SIGNIFICANT CHANGE UP (ref 22–31)
CREAT SERPL-MCNC: 0.7 MG/DL — SIGNIFICANT CHANGE UP (ref 0.5–1.3)
GLUCOSE SERPL-MCNC: 96 MG/DL — SIGNIFICANT CHANGE UP (ref 70–99)
HCT VFR BLD CALC: 38.7 % — SIGNIFICANT CHANGE UP (ref 34.5–45)
HGB BLD-MCNC: 12.7 G/DL — SIGNIFICANT CHANGE UP (ref 11.5–15.5)
MCHC RBC-ENTMCNC: 29.1 PG — SIGNIFICANT CHANGE UP (ref 27–34)
MCHC RBC-ENTMCNC: 32.8 GM/DL — SIGNIFICANT CHANGE UP (ref 32–36)
MCV RBC AUTO: 88.8 FL — SIGNIFICANT CHANGE UP (ref 80–100)
NRBC # BLD: 0 /100 WBCS — SIGNIFICANT CHANGE UP (ref 0–0)
PLATELET # BLD AUTO: 222 K/UL — SIGNIFICANT CHANGE UP (ref 150–400)
POTASSIUM SERPL-MCNC: 4.7 MMOL/L — SIGNIFICANT CHANGE UP (ref 3.5–5.3)
POTASSIUM SERPL-SCNC: 4.7 MMOL/L — SIGNIFICANT CHANGE UP (ref 3.5–5.3)
RBC # BLD: 4.36 M/UL — SIGNIFICANT CHANGE UP (ref 3.8–5.2)
RBC # FLD: 13.5 % — SIGNIFICANT CHANGE UP (ref 10.3–14.5)
SODIUM SERPL-SCNC: 142 MMOL/L — SIGNIFICANT CHANGE UP (ref 135–145)
WBC # BLD: 8.42 K/UL — SIGNIFICANT CHANGE UP (ref 3.8–10.5)
WBC # FLD AUTO: 8.42 K/UL — SIGNIFICANT CHANGE UP (ref 3.8–10.5)

## 2020-12-10 PROCEDURE — 73560 X-RAY EXAM OF KNEE 1 OR 2: CPT | Mod: 26,LT

## 2020-12-10 PROCEDURE — 88305 TISSUE EXAM BY PATHOLOGIST: CPT | Mod: 26

## 2020-12-10 PROCEDURE — 88311 DECALCIFY TISSUE: CPT | Mod: 26

## 2020-12-10 RX ORDER — FENTANYL CITRATE 50 UG/ML
25 INJECTION INTRAVENOUS
Refills: 0 | Status: DISCONTINUED | OUTPATIENT
Start: 2020-12-10 | End: 2020-12-10

## 2020-12-10 RX ORDER — OXYCODONE HYDROCHLORIDE 5 MG/1
5 TABLET ORAL EVERY 4 HOURS
Refills: 0 | Status: DISCONTINUED | OUTPATIENT
Start: 2020-12-10 | End: 2020-12-11

## 2020-12-10 RX ORDER — LANOLIN ALCOHOL/MO/W.PET/CERES
3 CREAM (GRAM) TOPICAL AT BEDTIME
Refills: 0 | Status: DISCONTINUED | OUTPATIENT
Start: 2020-12-10 | End: 2020-12-11

## 2020-12-10 RX ORDER — SODIUM CHLORIDE 9 MG/ML
3 INJECTION INTRAMUSCULAR; INTRAVENOUS; SUBCUTANEOUS EVERY 8 HOURS
Refills: 0 | Status: DISCONTINUED | OUTPATIENT
Start: 2020-12-10 | End: 2020-12-10

## 2020-12-10 RX ORDER — SODIUM CHLORIDE 9 MG/ML
1000 INJECTION, SOLUTION INTRAVENOUS
Refills: 0 | Status: DISCONTINUED | OUTPATIENT
Start: 2020-12-10 | End: 2020-12-10

## 2020-12-10 RX ORDER — METOCLOPRAMIDE HCL 10 MG
10 TABLET ORAL ONCE
Refills: 0 | Status: DISCONTINUED | OUTPATIENT
Start: 2020-12-10 | End: 2020-12-10

## 2020-12-10 RX ORDER — TRANEXAMIC ACID 100 MG/ML
1000 INJECTION, SOLUTION INTRAVENOUS ONCE
Refills: 0 | Status: COMPLETED | OUTPATIENT
Start: 2020-12-10 | End: 2020-12-10

## 2020-12-10 RX ORDER — OXYCODONE HYDROCHLORIDE 5 MG/1
10 TABLET ORAL EVERY 4 HOURS
Refills: 0 | Status: DISCONTINUED | OUTPATIENT
Start: 2020-12-10 | End: 2020-12-11

## 2020-12-10 RX ORDER — ACETAMINOPHEN 500 MG
650 TABLET ORAL EVERY 6 HOURS
Refills: 0 | Status: DISCONTINUED | OUTPATIENT
Start: 2020-12-10 | End: 2020-12-11

## 2020-12-10 RX ORDER — PANTOPRAZOLE SODIUM 20 MG/1
40 TABLET, DELAYED RELEASE ORAL
Refills: 0 | Status: DISCONTINUED | OUTPATIENT
Start: 2020-12-10 | End: 2020-12-10

## 2020-12-10 RX ORDER — DEXAMETHASONE 0.5 MG/5ML
10 ELIXIR ORAL ONCE
Refills: 0 | Status: COMPLETED | OUTPATIENT
Start: 2020-12-11 | End: 2020-12-11

## 2020-12-10 RX ORDER — CELECOXIB 200 MG/1
200 CAPSULE ORAL ONCE
Refills: 0 | Status: COMPLETED | OUTPATIENT
Start: 2020-12-10 | End: 2020-12-10

## 2020-12-10 RX ORDER — HYDROMORPHONE HYDROCHLORIDE 2 MG/ML
0.5 INJECTION INTRAMUSCULAR; INTRAVENOUS; SUBCUTANEOUS EVERY 4 HOURS
Refills: 0 | Status: DISCONTINUED | OUTPATIENT
Start: 2020-12-10 | End: 2020-12-11

## 2020-12-10 RX ORDER — FENTANYL CITRATE 50 UG/ML
50 INJECTION INTRAVENOUS
Refills: 0 | Status: DISCONTINUED | OUTPATIENT
Start: 2020-12-10 | End: 2020-12-10

## 2020-12-10 RX ORDER — KETOROLAC TROMETHAMINE 30 MG/ML
30 SYRINGE (ML) INJECTION EVERY 8 HOURS
Refills: 0 | Status: DISCONTINUED | OUTPATIENT
Start: 2020-12-10 | End: 2020-12-11

## 2020-12-10 RX ORDER — MAGNESIUM HYDROXIDE 400 MG/1
30 TABLET, CHEWABLE ORAL DAILY
Refills: 0 | Status: DISCONTINUED | OUTPATIENT
Start: 2020-12-10 | End: 2020-12-11

## 2020-12-10 RX ORDER — ACETAMINOPHEN 500 MG
650 TABLET ORAL ONCE
Refills: 0 | Status: COMPLETED | OUTPATIENT
Start: 2020-12-10 | End: 2020-12-10

## 2020-12-10 RX ORDER — CEFAZOLIN SODIUM 1 G
2000 VIAL (EA) INJECTION EVERY 8 HOURS
Refills: 0 | Status: COMPLETED | OUTPATIENT
Start: 2020-12-10 | End: 2020-12-11

## 2020-12-10 RX ORDER — SENNA PLUS 8.6 MG/1
2 TABLET ORAL AT BEDTIME
Refills: 0 | Status: DISCONTINUED | OUTPATIENT
Start: 2020-12-10 | End: 2020-12-11

## 2020-12-10 RX ORDER — CEFAZOLIN SODIUM 1 G
2000 VIAL (EA) INJECTION EVERY 8 HOURS
Refills: 0 | Status: DISCONTINUED | OUTPATIENT
Start: 2020-12-10 | End: 2020-12-10

## 2020-12-10 RX ORDER — POLYETHYLENE GLYCOL 3350 17 G/17G
17 POWDER, FOR SOLUTION ORAL DAILY
Refills: 0 | Status: DISCONTINUED | OUTPATIENT
Start: 2020-12-10 | End: 2020-12-11

## 2020-12-10 RX ORDER — SODIUM CHLORIDE 9 MG/ML
1000 INJECTION, SOLUTION INTRAVENOUS
Refills: 0 | Status: DISCONTINUED | OUTPATIENT
Start: 2020-12-10 | End: 2020-12-11

## 2020-12-10 RX ORDER — ASPIRIN/CALCIUM CARB/MAGNESIUM 324 MG
81 TABLET ORAL
Refills: 0 | Status: DISCONTINUED | OUTPATIENT
Start: 2020-12-11 | End: 2020-12-11

## 2020-12-10 RX ORDER — PANTOPRAZOLE SODIUM 20 MG/1
40 TABLET, DELAYED RELEASE ORAL
Refills: 0 | Status: DISCONTINUED | OUTPATIENT
Start: 2020-12-10 | End: 2020-12-11

## 2020-12-10 RX ORDER — ASCORBIC ACID 60 MG
500 TABLET,CHEWABLE ORAL
Refills: 0 | Status: DISCONTINUED | OUTPATIENT
Start: 2020-12-10 | End: 2020-12-11

## 2020-12-10 RX ORDER — ONDANSETRON 8 MG/1
4 TABLET, FILM COATED ORAL EVERY 6 HOURS
Refills: 0 | Status: DISCONTINUED | OUTPATIENT
Start: 2020-12-10 | End: 2020-12-11

## 2020-12-10 RX ORDER — BENZOCAINE AND MENTHOL 5; 1 G/100ML; G/100ML
1 LIQUID ORAL
Refills: 0 | Status: DISCONTINUED | OUTPATIENT
Start: 2020-12-10 | End: 2020-12-11

## 2020-12-10 RX ORDER — CELECOXIB 200 MG/1
200 CAPSULE ORAL
Refills: 0 | Status: DISCONTINUED | OUTPATIENT
Start: 2020-12-11 | End: 2020-12-11

## 2020-12-10 RX ADMIN — OXYCODONE HYDROCHLORIDE 5 MILLIGRAM(S): 5 TABLET ORAL at 22:34

## 2020-12-10 RX ADMIN — TRANEXAMIC ACID 220 MILLIGRAM(S): 100 INJECTION, SOLUTION INTRAVENOUS at 12:15

## 2020-12-10 RX ADMIN — Medication 100 MILLIGRAM(S): at 17:11

## 2020-12-10 RX ADMIN — Medication 30 MILLIGRAM(S): at 19:52

## 2020-12-10 RX ADMIN — Medication 30 MILLILITER(S): at 15:26

## 2020-12-10 RX ADMIN — CELECOXIB 200 MILLIGRAM(S): 200 CAPSULE ORAL at 08:26

## 2020-12-10 RX ADMIN — Medication 30 MILLIGRAM(S): at 20:23

## 2020-12-10 RX ADMIN — OXYCODONE HYDROCHLORIDE 5 MILLIGRAM(S): 5 TABLET ORAL at 17:40

## 2020-12-10 RX ADMIN — Medication 500 MILLIGRAM(S): at 17:11

## 2020-12-10 RX ADMIN — Medication 650 MILLIGRAM(S): at 08:27

## 2020-12-10 RX ADMIN — SODIUM CHLORIDE 3 MILLILITER(S): 9 INJECTION INTRAMUSCULAR; INTRAVENOUS; SUBCUTANEOUS at 08:27

## 2020-12-10 RX ADMIN — OXYCODONE HYDROCHLORIDE 5 MILLIGRAM(S): 5 TABLET ORAL at 17:10

## 2020-12-10 RX ADMIN — FENTANYL CITRATE 50 MICROGRAM(S): 50 INJECTION INTRAVENOUS at 12:30

## 2020-12-10 RX ADMIN — FENTANYL CITRATE 50 MICROGRAM(S): 50 INJECTION INTRAVENOUS at 12:15

## 2020-12-10 RX ADMIN — SODIUM CHLORIDE 125 MILLILITER(S): 9 INJECTION, SOLUTION INTRAVENOUS at 15:25

## 2020-12-10 RX ADMIN — OXYCODONE HYDROCHLORIDE 5 MILLIGRAM(S): 5 TABLET ORAL at 17:11

## 2020-12-10 RX ADMIN — SODIUM CHLORIDE 125 MILLILITER(S): 9 INJECTION, SOLUTION INTRAVENOUS at 12:15

## 2020-12-10 NOTE — PROGRESS NOTE ADULT - SUBJECTIVE AND OBJECTIVE BOX
Post-op Check   POD#0 S/P Left TKA  60yFemale Patient seen and examined, Pain controlled  Patient Denies SOB, CP, N/V/D       PE: Left Knee/LE: Dressing C/D/I, Sensation/motor intact, DP 2+, FROM ankle/toes   B/L LE: Skin intact. +ROM hip/knee/ankle/toes. Ankle Dorsi/plantarflexion: 5/5. Calf: soft, compressible and nontender. DP/PT 2+ NVI                          12.7   8.42  )-----------( 222      ( 10 Dec 2020 12:06 )             38.7       12-10    142  |  110<H>  |  19  ----------------------------<  96  4.7   |  24  |  0.70    Ca    9.1      10 Dec 2020 12:06          A: As above   P: Pain Control       DVT Prophylaxis      Incentive spirometry      PT WBAT LLE      Isometric exercises      Discharge Planning      All the above discussed and understood by pt       Ortho to F/U

## 2020-12-10 NOTE — PHYSICAL THERAPY INITIAL EVALUATION ADULT - ACTIVE RANGE OF MOTION EXAMINATION, REHAB EVAL
bilateral upper extremity Active ROM was WFL (within functional limits)/bilateral  lower extremity Active ROM was WFL (within functional limits)/L Knee flexion 0-75 degrees due to pain

## 2020-12-10 NOTE — DISCHARGE NOTE PROVIDER - NSDCACTIVITY_GEN_ALL_CORE
Walking - Outdoors allowed/Walking - Indoors allowed/No heavy lifting/straining/Do not drive or operate machinery/Stairs allowed/Showering allowed

## 2020-12-10 NOTE — OCCUPATIONAL THERAPY INITIAL EVALUATION ADULT - ADDITIONAL COMMENTS
Pre op assessment confirmed at bedside- Patient lives with spouse and child (Who can assist post op) in a private house with 5 steps to enter with bilateral handrails that are far apart. Once inside, the patient main bedroom and bathroom is on that floor when entering. The patient bathroom has a walk in shower stall, retractable shower head, standard toilet seat and no grab bars. The patient has a 3/1 commode, but does not use it. The patient ambulates with no device and owns a rolling walker, cane and crutches.

## 2020-12-10 NOTE — DISCHARGE NOTE PROVIDER - HOSPITAL COURSE
60yFemale with history of Left Knee Osteoarthritis presenting for Left TKA by Dr. Goodman on 12/10/20. Risk and benefits of surgery were explained to the patient. The patient understood and agreed to proceed with surgery. Patient underwent the procedure with no intraoperative complications. Pt was brought in stable condition to the PACU. Once stable in PACU, pt was brought to the floor. During hospital stay pt was followed by Medicine during this admission. Pt had an uneventful hospital course. Pt is stable for discharge to [rehab/home] on POD#[ ] 60yFemale with history of Left Knee Osteoarthritis presenting for Left TKA by Dr. Goodman on 12/10/20. Risk and benefits of surgery were explained to the patient. The patient understood and agreed to proceed with surgery. Patient underwent the procedure with no intraoperative complications. Pt was brought in stable condition to the PACU. Once stable in PACU, pt was brought to the floor. During hospital stay pt was followed by Medicine during this admission. Pt had an uneventful hospital course. Pt is stable for discharge to home on POD# 1

## 2020-12-10 NOTE — DISCHARGE NOTE PROVIDER - CARE PROVIDER_API CALL
Altaf Goodman)  Orthopaedic Surgery  [address] [place] [address]  Phone: [phone]  Fax: [phone]  Follow Up Time:

## 2020-12-10 NOTE — ASU PATIENT PROFILE, ADULT - PMH
GERD (gastroesophageal reflux disease)    Knee osteoarthritis  left  Sleep apnea    Umbilical hernia without obstruction and without gangrene    Unilateral primary osteoarthritis, right knee

## 2020-12-10 NOTE — PHYSICAL THERAPY INITIAL EVALUATION ADULT - ADDITIONAL COMMENTS
Home set-up: lives with spouse and daughter in private house with 5 stair steps with x2 handrails wide apart to enter at front. All amenities at same level once inside. Bathroom is a step-in shower without grab rails, with a standard height toilet seat, with retractable shower head. Endorses will be supported by family post-op. Patient is currently independent with mobility. Reports owns a rolling walker, cane and commode. Patient is right-handed and drives; wears glasses for reading. Patient denies falls in past 6 months. Denies buckling.

## 2020-12-10 NOTE — PHYSICAL THERAPY INITIAL EVALUATION ADULT - GENERAL OBSERVATIONS, REHAB EVAL
Pt encountered seated in bedside recliner, family at bedside, A&Ox4, +IV lock, +cryocuff, +C/D/I dressing to L knee, 6/10 L knee pain, NAD and comfortable.

## 2020-12-10 NOTE — ASU PATIENT PROFILE, ADULT - HEALTHCARE INFORMATION NEEDED, PROFILE
Physical Therapy Med Surg Initial Assessment  Facility/Department: [facility]  [place] [facility] [place]       NAME: Rajendra Mclaughlin  : 1946 (25 y.o.)  MRN: [id number]  CODE STATUS: DNR-CC    Date of Service: 2020    Patient Diagnosis(es): Seizure [facility]) [R56.9]   Chief Complaint   Patient presents with    Fatigue     Patient Active Problem List    Diagnosis Date Noted    Coronary artery disease involving native coronary artery of native heart without angina pectoris 2017     Priority: High    Syncope 2016     Priority: High    Essential hypertension 2016     Priority: High    Dyslipidemia 2016     Priority: High    Tobacco abuse 2016     Priority: High    Secondary malignant neoplasm of brain [facility])     Chronic respiratory failure with hypoxia, on home oxygen therapy ([address]     Seizure ([address] 2020    Moderate malnutrition ([address] 2020    Nausea & vomiting 2020    Factor V deficiency ([address] 2019    Palliative care patient 2019    Osteoporosis 2019    Back pain, chronic 2019    Obesity (BMI 30-39.9) 2019    Abnormality of gait and mobility due to 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   [facility] admit 19. 2019    Ataxy cerebellar ([address] 08/10/2019    Neoplasm of brain causing mass effect on adjacent structures ([address] 2019    COPD (chronic obstructive pulmonary disease) ([address] 2019    Brain mass 2019    Age-related osteoporosis without current pathological fracture 2019    Elevated alkaline phosphatase level 2019    Fracture of vertebra due to osteoporosis ([address] 2019    Presence of permanent cardiac pacemaker 2019    SSS (sick sinus syndrome) ([address] 2019    Hiatal hernia 2019    Idiopathic chronic pancreatitis ([address] 2019    Bilateral carotid artery stenosis 11/15/2018    PAD (peripheral artery disease) ([address] 11/15/2018    Chronic anticoagulation 11/08/2018    CKD (chronic kidney disease), stage III ([address] 11/08/2018    VTE (venous thromboembolism) 11/08/2018    MGUS (monoclonal gammopathy of unknown significance) 11/08/2018    SOB (shortness of breath) on exertion     Lung nodule 05/14/2018    Hypotension     Factor V Leiden mutation ([address] 05/15/2015    Hypercoagulable state, primary ([address] 05/15/2015    Esophageal dysphagia 03/10/2015    Tobacco dependence 08/25/2014    DVT (deep venous thrombosis) ([address] 08/01/2014    Facet arthropathy, lumbar 07/20/2011    Lumbosacral spondylosis without myelopathy 07/20/2011        Past Medical History:   Diagnosis Date    Arthritis     Back pain, chronic     Pt  is with pain management    Cancer ([address]     lung w brain metastasis    Cerebellar ataxia ([address]     Chronic back pain     degenerative disc disease    Chronic respiratory failure with hypoxia, on home oxygen therapy ([facility])     CKD (chronic kidney disease) stage 3, GFR 30-59 ml/min ([facility])     COPD (chronic obstructive pulmonary disease) ([address]     Coronary artery disease     Coronary artery disease involving native coronary artery of native heart without angina pectoris 3/19/2017    Dyslipidemia 11/20/2016    Emphysema lung ([address]     Essential hypertension 11/20/2016    Factor V deficiency ([address]     H/O blood clots     Headache     Hypertension     Primary cancer of right lung metastatic to other site [facility])     Restless leg syndrome     Secondary malignant neoplasm of brain ([address]     Sick sinus syndrome ([address]     Syncope 11/20/2016    Tobacco abuse 11/20/2016     Past Surgical History:   Procedure Laterality Date    BRAIN SURGERY      BREAST BIOPSY Right     CARDIAC PACEMAKER PLACEMENT      CORONARY ANGIOPLASTY WITH STENT PLACEMENT      CRANIOTOMY N/A 8/6/2019    SUBOCCIPITAL CRANIOTOMY FOR TUMOR AND VENTRICULOSTOMY performed by Pascale Johnson MD at [facility] Chart Reviewed: Yes  Patient assessed for rehabilitation services?: Yes  Family / Caregiver Present: No  General Comment  Comments: Pt awake in bed, agreeable to PT eval.    Restrictions:  Restrictions/Precautions: Fall Risk, Seizure     SUBJECTIVE: Subjective: Pt reports feeling tired of being in bed. Pain  Pre Treatment Pain Screening  Pain at present: 0    Post Treatment Pain Screening:   Pain Screening  Patient Currently in Pain: Denies  Pain Assessment  Pain Level: 0    Prior Level of Function:  Social/Functional History  Lives With: Daughter  Type of Home: House  Home Layout: One level  Home Access: Stairs to enter with rails  Entrance Stairs - Number of Steps: several.  Pt reports that a ramp is supposed to be built  Bathroom Shower/Tub: Tub/Shower unit  Bathroom Equipment: Shower chair, 3-in-1 commode  Home Equipment: 4 wheeled walker, Rolling walker  ADL Assistance: Needs assistance  Homemaking Responsibilities: No  Ambulation Assistance: Needs assistance  Transfer Assistance: Needs assistance  Active : No  Additional Comments: Pt was at home with hospice care. OBJECTIVE:   Vision: Impaired  Vision Exceptions: Wears glasses for reading  Hearing: Within functional limits    Cognition:  Overall Orientation Status: Impaired  Orientation Level: Oriented to person, Oriented to place(states year 2020, month March)  Follows Commands: Within Functional Limits    Observation/Palpation  Observation: 2L02    ROM:  RLE AROM: WFL  LLE AROM : WFL    Strength:  Strength RLE  Comment: grossly 3+/5  Strength LLE  Comment: grossly 3+/5    Neuro:  Balance  Sitting - Static: Good  Sitting - Dynamic: Good  Standing - Static: Fair;-  Standing - Dynamic: Poor             Bed mobility  Supine to Sit: Moderate assistance  Sit to Supine:  Moderate assistance    Transfers  Sit to Stand: Minimal Assistance  Stand to sit: Minimal Assistance    Ambulation  Ambulation?: Yes  Ambulation 1  Surface: level tile  Device: [address]: Minimal assistance  Quality of Gait: took 3 side steps to R along EOB only; bilat LE weakness and unsafe to ambulate fwd away from bed              Activity Tolerance  Activity Tolerance: Patient Tolerated treatment well          PT Education  PT Education: Goals;PT Role;Plan of Care    ASSESSMENT:   Body structures, Functions, Activity limitations: Decreased functional mobility ; Decreased balance;Decreased endurance;Decreased strength  Decision Making: Medium Complexity  History: high  Exam: medium  Clinical Presentation: high    Prognosis: Good    DISCHARGE RECOMMENDATIONS:  Discharge Recommendations: Patient would benefit from continued therapy after discharge    Assessment: Pt demonstrates the above deficits and decline in functional mobility status placing them at increased risk for falls. Pt would benefit from physical therapy to address above deficits and allow for safe return home at highest level of function, decrease risk for falls, and improve QOL.     REQUIRES PT FOLLOW UP: Yes      PLAN OF CARE:  Plan  Times per week: 3-6  Current Treatment Recommendations: Strengthening, Transfer Training, Endurance Training, Neuromuscular Re-education, Patient/Caregiver Education & Training, Equipment Evaluation, Education, & procurement, Balance Training, Gait Training, Functional Mobility Training, Home Exercise Program, Safety Education & Training  Safety Devices  Type of devices: Bed alarm in place, Call light within reach    Goals:  Patient goals : agreeable to PT POC  Long term goals  Long term goal 1: pt to be SBA with bed mobility  Long term goal 2: pt to be SBA with pivot transfers  Long term goal 3: pt to ambulate >25 ft with [employer]  Long term goal 4: pt to be indep with HEP    [facility] (6 CLICK) [address] Mobility Raw Score : 13     Therapy Time:   Individual   Time In 1045   Time Out 1100   Minutes C/Marcus Gonsalez [place], 05/26/20 at 11:07 AM Definitions for assistance levels  Independent = pt does not require any physical supervision or assistance from another person for activity completion. Device may be needed.   Stand by assistance = pt requires verbal cues or instructions from another person, close to but not touching, to perform the activity  Minimal assistance= pt performs 75% or more of the activity; assistance is required to complete the activity  Moderate assistance= pt performs 50% of the activity; assistance is required to complete the activity  Maximal assistance = pt performs 25% of the activity; assistance is required to complete the activity  Dependent = pt requires total physical assistance to accomplish the task to be inform

## 2020-12-10 NOTE — DISCHARGE NOTE PROVIDER - NSDCMRMEDTOKEN_GEN_ALL_CORE_FT
Multiple Vitamins oral tablet: 1 tab(s) orally once a day  pantoprazole 40 mg oral delayed release tablet: 1 tab(s) orally once a day (before a meal) MDD:1   acetaminophen 325 mg oral tablet: 2 tab(s) orally every 6 hours, As needed, Temp greater or equal to 38C (100.4F), Mild Pain (1 - 3)  ascorbic acid 500 mg oral tablet: 1 tab(s) orally 2 times a day  aspirin 81 mg oral delayed release tablet: 1 tab(s) orally every 12 hours for DVT prophylaxis   celecoxib 200 mg oral capsule: 1 cap(s) orally 2 times a day  Multiple Vitamins oral tablet: 1 tab(s) orally once a day  oxyCODONE 5 mg oral tablet: 1 -2 tabs orally every 4 hours prn pain  MDD:12  pantoprazole 40 mg oral delayed release tablet: 1 tab(s) orally once a day (before a meal) MDD:1  senna oral tablet: 2 tab(s) orally once a day (at bedtime)  Please take this medication while taking pain medications

## 2020-12-10 NOTE — OCCUPATIONAL THERAPY INITIAL EVALUATION ADULT - GENERAL OBSERVATIONS, REHAB EVAL
Pt encountered supine in bed, NAD, AXOX4, cardiac monitor intact. Pt c/o pain s/p left total knee replacement.

## 2020-12-10 NOTE — CONSULT NOTE ADULT - SUBJECTIVE AND OBJECTIVE BOX
HERON RICK is a 60y Female s/p LEFT TOTAL KNEE ARTHROPLASTY;EXTENSIVE SURGERY    LEFT TOTAL KNEE ARTHROPLASTY      w/ h/o GERD (gastroesophageal reflux disease)    Knee osteoarthritis    Unilateral primary osteoarthritis, right knee    Umbilical hernia without obstruction and without gangrene    Sleep apnea      denies any chest pain shortness of breath palpitation dizziness lightheadedness nausea vomiting fever or chills    H/O arthroscopy    H/O total knee replacement, right    S/P arthroscopic surgery of right knee    H/O tubal ligation    H/O bilateral oophorectomy    H/O ventral hernia repair    H/O sinus surgery    S/P left knee arthroscopy      Family history of ovarian cancer (Mother, Sibling)      SH: doesnot smoke or drink at this time    Bananas (Vomiting; Diarrhea)  penicillins (Rash)    acetaminophen   Tablet .. 650 milliGRAM(s) Oral every 6 hours PRN  aluminum hydroxide/magnesium hydroxide/simethicone Suspension 30 milliLiter(s) Oral four times a day PRN  ascorbic acid 500 milliGRAM(s) Oral two times a day  benzocaine 15 mG/menthol 3.6 mG (Sugar-Free) Lozenge 1 Lozenge Oral every 3 hours PRN  ceFAZolin   IVPB 2000 milliGRAM(s) IV Intermittent every 8 hours  HYDROmorphone  Injectable 0.5 milliGRAM(s) IV Push every 4 hours PRN  ketorolac   Injectable 30 milliGRAM(s) IV Push every 8 hours  lactated ringers. 1000 milliLiter(s) IV Continuous <Continuous>  magnesium hydroxide Suspension 30 milliLiter(s) Oral daily PRN  melatonin 3 milliGRAM(s) Oral at bedtime PRN  multivitamin 1 Tablet(s) Oral daily  ondansetron Injectable 4 milliGRAM(s) IV Push every 6 hours PRN  oxyCODONE    IR 5 milliGRAM(s) Oral every 4 hours PRN  oxyCODONE    IR 10 milliGRAM(s) Oral every 4 hours PRN  oxyCODONE    IR 5 milliGRAM(s) Oral every 4 hours  pantoprazole    Tablet 40 milliGRAM(s) Oral before breakfast  polyethylene glycol 3350 17 Gram(s) Oral daily  senna 2 Tablet(s) Oral at bedtime PRN    T(C): 36.6 (12-10-20 @ 17:30), Max: 36.7 (12-10-20 @ 15:30)  HR: 83 (12-10-20 @ 17:23) (71 - 86)  BP: 129/81 (12-10-20 @ 17:23) (106/67 - 158/85)  RR: 20 (12-10-20 @ 17:23) (12 - 20)  SpO2: 95% (12-10-20 @ 17:23) (95% - 97%)  HEENT unremarkable  neck no JVD or bruit  heart normal S1 S2 RRR no gallops or rubs  chest clear to auscultation  abd sof nontender non distended +bs  ext no calf tenderness    A/P   DVT PX  pain control  bowel regimen   wound care as per ortho  GI PX  antiemetics prn  incentive spirometer

## 2020-12-10 NOTE — OCCUPATIONAL THERAPY INITIAL EVALUATION ADULT - BALANCE TRAINING, PT EVAL
Pt will increase dynamic standing balance to good in one week to perform self care tasks independently

## 2020-12-10 NOTE — PHYSICAL THERAPY INITIAL EVALUATION ADULT - GAIT TRAINING, PT EVAL
Patient will ambulate 500 feet with rolling walker independently for community ambulation in 2-3 days. Patient will ascend/descend 5 steps with R rail up/R rail down and straight cane independently in 2-3 days to safely navigate home environment.

## 2020-12-11 ENCOUNTER — TRANSCRIPTION ENCOUNTER (OUTPATIENT)
Age: 60
End: 2020-12-11

## 2020-12-11 VITALS
DIASTOLIC BLOOD PRESSURE: 68 MMHG | OXYGEN SATURATION: 98 % | HEART RATE: 72 BPM | TEMPERATURE: 98 F | SYSTOLIC BLOOD PRESSURE: 122 MMHG | RESPIRATION RATE: 17 BRPM

## 2020-12-11 LAB
ANION GAP SERPL CALC-SCNC: 5 MMOL/L — SIGNIFICANT CHANGE UP (ref 5–17)
BUN SERPL-MCNC: 16 MG/DL — SIGNIFICANT CHANGE UP (ref 7–23)
CALCIUM SERPL-MCNC: 9 MG/DL — SIGNIFICANT CHANGE UP (ref 8.5–10.1)
CHLORIDE SERPL-SCNC: 106 MMOL/L — SIGNIFICANT CHANGE UP (ref 96–108)
CO2 SERPL-SCNC: 28 MMOL/L — SIGNIFICANT CHANGE UP (ref 22–31)
CREAT SERPL-MCNC: 0.81 MG/DL — SIGNIFICANT CHANGE UP (ref 0.5–1.3)
GLUCOSE SERPL-MCNC: 131 MG/DL — HIGH (ref 70–99)
HCT VFR BLD CALC: 39.2 % — SIGNIFICANT CHANGE UP (ref 34.5–45)
HGB BLD-MCNC: 12.5 G/DL — SIGNIFICANT CHANGE UP (ref 11.5–15.5)
MCHC RBC-ENTMCNC: 28.9 PG — SIGNIFICANT CHANGE UP (ref 27–34)
MCHC RBC-ENTMCNC: 31.9 GM/DL — LOW (ref 32–36)
MCV RBC AUTO: 90.5 FL — SIGNIFICANT CHANGE UP (ref 80–100)
NRBC # BLD: 0 /100 WBCS — SIGNIFICANT CHANGE UP (ref 0–0)
PLATELET # BLD AUTO: 245 K/UL — SIGNIFICANT CHANGE UP (ref 150–400)
POTASSIUM SERPL-MCNC: 4.1 MMOL/L — SIGNIFICANT CHANGE UP (ref 3.5–5.3)
POTASSIUM SERPL-SCNC: 4.1 MMOL/L — SIGNIFICANT CHANGE UP (ref 3.5–5.3)
RBC # BLD: 4.33 M/UL — SIGNIFICANT CHANGE UP (ref 3.8–5.2)
RBC # FLD: 13.6 % — SIGNIFICANT CHANGE UP (ref 10.3–14.5)
SODIUM SERPL-SCNC: 139 MMOL/L — SIGNIFICANT CHANGE UP (ref 135–145)
WBC # BLD: 14.33 K/UL — HIGH (ref 3.8–10.5)
WBC # FLD AUTO: 14.33 K/UL — HIGH (ref 3.8–10.5)

## 2020-12-11 RX ORDER — SENNA PLUS 8.6 MG/1
2 TABLET ORAL
Qty: 0 | Refills: 0 | DISCHARGE
Start: 2020-12-11

## 2020-12-11 RX ORDER — OXYCODONE HYDROCHLORIDE 5 MG/1
1 TABLET ORAL
Qty: 60 | Refills: 0
Start: 2020-12-11 | End: 2020-12-15

## 2020-12-11 RX ORDER — ASCORBIC ACID 60 MG
1 TABLET,CHEWABLE ORAL
Qty: 0 | Refills: 0 | DISCHARGE
Start: 2020-12-11

## 2020-12-11 RX ORDER — ACETAMINOPHEN 500 MG
2 TABLET ORAL
Qty: 0 | Refills: 0 | DISCHARGE
Start: 2020-12-11

## 2020-12-11 RX ORDER — ASPIRIN/CALCIUM CARB/MAGNESIUM 324 MG
1 TABLET ORAL
Qty: 70 | Refills: 0
Start: 2020-12-11 | End: 2021-01-14

## 2020-12-11 RX ORDER — CELECOXIB 200 MG/1
1 CAPSULE ORAL
Qty: 60 | Refills: 0
Start: 2020-12-11 | End: 2021-01-09

## 2020-12-11 RX ADMIN — Medication 30 MILLIGRAM(S): at 04:15

## 2020-12-11 RX ADMIN — CELECOXIB 200 MILLIGRAM(S): 200 CAPSULE ORAL at 05:39

## 2020-12-11 RX ADMIN — CELECOXIB 200 MILLIGRAM(S): 200 CAPSULE ORAL at 06:47

## 2020-12-11 RX ADMIN — OXYCODONE HYDROCHLORIDE 5 MILLIGRAM(S): 5 TABLET ORAL at 02:02

## 2020-12-11 RX ADMIN — Medication 500 MILLIGRAM(S): at 05:39

## 2020-12-11 RX ADMIN — Medication 30 MILLIGRAM(S): at 12:50

## 2020-12-11 RX ADMIN — OXYCODONE HYDROCHLORIDE 5 MILLIGRAM(S): 5 TABLET ORAL at 06:40

## 2020-12-11 RX ADMIN — Medication 1 TABLET(S): at 12:30

## 2020-12-11 RX ADMIN — Medication 100 MILLIGRAM(S): at 01:05

## 2020-12-11 RX ADMIN — Medication 81 MILLIGRAM(S): at 05:40

## 2020-12-11 RX ADMIN — PANTOPRAZOLE SODIUM 40 MILLIGRAM(S): 20 TABLET, DELAYED RELEASE ORAL at 06:54

## 2020-12-11 RX ADMIN — OXYCODONE HYDROCHLORIDE 5 MILLIGRAM(S): 5 TABLET ORAL at 05:40

## 2020-12-11 RX ADMIN — OXYCODONE HYDROCHLORIDE 5 MILLIGRAM(S): 5 TABLET ORAL at 03:00

## 2020-12-11 RX ADMIN — Medication 30 MILLIGRAM(S): at 12:31

## 2020-12-11 RX ADMIN — Medication 30 MILLIGRAM(S): at 03:58

## 2020-12-11 RX ADMIN — Medication 102 MILLIGRAM(S): at 05:39

## 2020-12-11 RX ADMIN — POLYETHYLENE GLYCOL 3350 17 GRAM(S): 17 POWDER, FOR SOLUTION ORAL at 12:30

## 2020-12-11 RX ADMIN — Medication 30 MILLILITER(S): at 13:17

## 2020-12-11 NOTE — PROGRESS NOTE ADULT - SUBJECTIVE AND OBJECTIVE BOX
HERON RICK is a 60y Female s/p LEFT TOTAL KNEE ARTHROPLASTY;EXTENSIVE SURGERY    LEFT TOTAL KNEE ARTHROPLASTY        denies any chest pain shortness of breath palpitation dizziness lightheadedness nausea vomiting fever or chills    T(C): 36.5 (12-11-20 @ 05:25), Max: 36.7 (12-10-20 @ 15:30)  HR: 65 (12-11-20 @ 05:25) (65 - 86)  BP: 106/70 (12-11-20 @ 05:25) (106/67 - 158/85)  RR: 18 (12-11-20 @ 05:25) (12 - 20)  SpO2: 95% (12-11-20 @ 05:25) (95% - 97%)  no jvd/bruit  s1 s2 rrr  cta  s/nt/nd  no calf tend                        12.5   14.33 )-----------( 245      ( 11 Dec 2020 06:53 )             39.2   12-11    139  |  106  |  16  ----------------------------<  131<H>  4.1   |  28  |  0.81    Ca    9.0      11 Dec 2020 06:53        cont dvt px  pain control  bowel regimen  antiemetics  incentive spirometer

## 2020-12-11 NOTE — PROGRESS NOTE ADULT - SUBJECTIVE AND OBJECTIVE BOX
60F s/p L TKA   Patient is seen and examined at bedside. Denies CP/SOB/Dizziness/N/V/D/HA. Pain is controlled.     Vital Signs Last 24 Hrs  T(C): 36.5 (11 Dec 2020 09:20), Max: 36.7 (10 Dec 2020 15:30)  T(F): 97.7 (11 Dec 2020 09:20), Max: 98.1 (10 Dec 2020 16:31)  HR: 64 (11 Dec 2020 09:20) (64 - 86)  BP: 109/71 (11 Dec 2020 09:20) (106/67 - 158/85)  BP(mean): --  RR: 18 (11 Dec 2020 09:20) (12 - 20)  SpO2: 98% (11 Dec 2020 09:20) (95% - 98%)      PHYSICAL EXAM:  General: NAD, WDWN.   Neuro:  Alert & responsive  HEENT: NCAT, EOMI, conjunctiva clear  abd: soft, NT/ND  LLE: Dressing C/D/I with ACE wrap in place.  Dressing removed today- Prineo dressing in place - c/d/i  Right LE: Motor intact + EHL/FHL/TA/GS.  Sensation is grossly intact.  Extremity warm, compartments soft, compressible. No calf tenderness. DP 2+   Left LE: Motor intact +EHL/FHL/TA/GS. Sensation is grossly intact. Extremity warm, compartments soft, compressible. No calf tenderness. DP2+    Labs:                          12.5   14.33 )-----------( 245      ( 11 Dec 2020 06:53 )             39.2       12-11    139  |  106  |  16  ----------------------------<  131<H>  4.1   |  28  |  0.81    Ca    9.0      11 Dec 2020 06:53        A/P: Patient is a 60y y/o Female s/p L TKA  POD # 1  -wound care, knee extension/leg elevation, cryocuff, isometric exercises, new medications reviewed with pt  -Pain control/analgesia  -Inc spirometry reviewed with pt, demonstrated competence  -DVT prophylaxis with Venodynes/Aspirin  -PT/OT/WBAT  -complete prophylactic Antibiotic  -medical consult  -DC planning: to home today        60F s/p L TKA   Patient is seen and examined at bedside. Denies CP/SOB/Dizziness/N/V/D/HA. Pain is controlled.     Vital Signs Last 24 Hrs  T(C): 36.5 (11 Dec 2020 09:20), Max: 36.7 (10 Dec 2020 15:30)  T(F): 97.7 (11 Dec 2020 09:20), Max: 98.1 (10 Dec 2020 16:31)  HR: 64 (11 Dec 2020 09:20) (64 - 86)  BP: 109/71 (11 Dec 2020 09:20) (106/67 - 158/85)  BP(mean): --  RR: 18 (11 Dec 2020 09:20) (12 - 20)  SpO2: 98% (11 Dec 2020 09:20) (95% - 98%)      PHYSICAL EXAM:  General: NAD, WDWN.   Neuro:  Alert & responsive  HEENT: NCAT, EOMI, conjunctiva clear  abd: soft, NT/ND  LLE: Dressing C/D/I with ACE wrap in place.  Ace wrap removed today- Prineo dressing in place - c/d/i  Right LE: Motor intact + EHL/FHL/TA/GS.  Sensation is grossly intact.  Extremity warm, compartments soft, compressible. No calf tenderness. DP 2+   Left LE: Motor intact +EHL/FHL/TA/GS. Sensation is grossly intact. Extremity warm, compartments soft, compressible. No calf tenderness. DP2+    Labs:                          12.5   14.33 )-----------( 245      ( 11 Dec 2020 06:53 )             39.2       12-11    139  |  106  |  16  ----------------------------<  131<H>  4.1   |  28  |  0.81    Ca    9.0      11 Dec 2020 06:53        A/P: Patient is a 60y y/o Female s/p L TKA  POD # 1  -wound care, knee extension/leg elevation, cryocuff, isometric exercises, new medications reviewed with pt  -Pain control/analgesia  -Inc spirometry reviewed with pt, demonstrated competence  -DVT prophylaxis with Venodynes/Aspirin  -PT/OT/WBAT  -complete prophylactic Antibiotic  -medical consult  -DC planning: to home today

## 2020-12-11 NOTE — DISCHARGE NOTE NURSING/CASE MANAGEMENT/SOCIAL WORK - PATIENT PORTAL LINK FT
You can access the FollowMyHealth Patient Portal offered by [facility] by registering at the following website: [email or web address]. By joining [facility]’s FollowMyHealth portal, you will also be able to view your health information using other applications (apps) compatible with our system.

## 2020-12-14 ENCOUNTER — NON-APPOINTMENT (OUTPATIENT)
Age: 60
End: 2020-12-14

## 2020-12-14 LAB — SURGICAL PATHOLOGY STUDY: SIGNIFICANT CHANGE UP

## 2020-12-15 DIAGNOSIS — M17.12 UNILATERAL PRIMARY OSTEOARTHRITIS, LEFT KNEE: ICD-10-CM

## 2020-12-15 PROBLEM — Z01.419 ENCOUNTER FOR GYNECOLOGICAL EXAMINATION WITHOUT ABNORMAL FINDING: Status: RESOLVED | Noted: 2017-03-20 | Resolved: 2020-12-15

## 2021-03-31 NOTE — PATIENT PROFILE ADULT - TRANSPORTATION
Physical Therapy  8C  Triage OT out  Visit Type: initial evaluation  Precautions:  Medical precautions: ; standard precautions.   Lines:     Basic: capped IV and telemetry      Lines in chart and on patient reviewed, cautions maintained throughout session.    SUBJECTIVE  Patient agreed to participate in therapy this date.  Pt feels that his function today is his baseline. He declines need for balance exercises. He does not have concern for ability to function at home.   Patient / Family Goal: return home     OBJECTIVE     Oriented to person, place and time     Arousal alertness: appropriate responses to stimuli  Patient activity tolerance: 2 to 1 activity to rest  Range of Motion (measured in degrees unless otherwise noted, active unless indicated)  WFL: LUE RUE  WFL: RLE, LLE  Strength (out of 5 unless otherwise indicated)   WFL: LLE, RLE  Balance    Sitting: Static: independent, Dynamic: independent    Standing - Firm Surface - Eyes Open: Static: independent Dynamic: supervision  Balance Details: Able to accept challenge all directions. Only the lightest challenge posteriorly with ankle reactions but not stepping reaction.     Bed Mobility:        Supine to sit: independent    Sit to supine: independent  Transfers:      Sit to stand: independent    Stand to sit: independent    Stand pivot: independent  Gait/Ambulation:     Assistance: supervision   Assistive device: gait belt    Distance (ft): 350  Training Completed:      Slightly narrow LINH and unsteadiness but no LOB.  Supervision for balance safety , good pace.  Stair Mobility:    Number of steps: 6;     Assistance: supervision    Rails: bilateral rails    Pattern: step to and reciprocal    Devices used: gait belt  Training completed:      Loose supervision for safety. Varied UE support 1 vs 2 rails      Interventions     Training provided: balance retraining and functional ambulation  Able to don doff sock. 2/3 word recall.    Pt declines standing balance  HEP.  Standing balance challenge and weight shift  Educated on role of PT and OT  Skilled input: Tactile instruction/cues       ASSESSMENT    Impairments: balance deficits  Functional Limitations: ambulation  Pt admitted  with abdominal distension. Pt from home with sisters family, independent at baseline. Pt feels that he is at baseline mobility balance and endurance and declines balance HEP. Pt given supervision with ambulation for mild decreased balance but he does not feel need for therapy. OT triaged out for ADLs and pt opinion of no needs. Will DC PT and OT services per pt request         Discharge Recommendations  Recommendation for Discharge: PT WI: Home                PT Identified Barriers to Discharge: none from mobility standpoint   Skilled therapy is not required due to  Predicted patient presentation: Low (stable) - Patient comorbidities and complexities, as defined above, will have little effect on progress for prescribed plan of care.    End of Session:   Location: in bed  Safety measures: call light within reach  Handoff to: nurse assistant    PLAN    Suggestions for next session as indicated: Frequency Comments: D/C PT pt preference. triage out OT 3/31        Interventions: functional transfer training, bed mobility, gait training, strengthening and endurance training  Agreement to plan and goals: patient agrees with goals and treatment plan      Documented in the chart in the following areas: Prior Level of Function. Pain. Assessment.      Therapy procedure time and total treatment time can be found documented on the Time Entry flowsheet   no

## 2021-04-12 ENCOUNTER — APPOINTMENT (OUTPATIENT)
Dept: DISASTER EMERGENCY | Facility: OTHER | Age: 61
End: 2021-04-12
Payer: COMMERCIAL

## 2021-04-12 PROCEDURE — 0012A: CPT

## 2021-12-26 ENCOUNTER — TRANSCRIPTION ENCOUNTER (OUTPATIENT)
Age: 61
End: 2021-12-26

## 2022-01-14 ENCOUNTER — NON-APPOINTMENT (OUTPATIENT)
Age: 62
End: 2022-01-14

## 2022-01-14 ENCOUNTER — APPOINTMENT (OUTPATIENT)
Dept: INTERNAL MEDICINE | Facility: CLINIC | Age: 62
End: 2022-01-14
Payer: COMMERCIAL

## 2022-01-14 VITALS
HEART RATE: 75 BPM | TEMPERATURE: 97.5 F | SYSTOLIC BLOOD PRESSURE: 142 MMHG | OXYGEN SATURATION: 98 % | BODY MASS INDEX: 30.62 KG/M2 | DIASTOLIC BLOOD PRESSURE: 84 MMHG | HEIGHT: 68 IN | WEIGHT: 202 LBS

## 2022-01-14 VITALS — DIASTOLIC BLOOD PRESSURE: 86 MMHG | SYSTOLIC BLOOD PRESSURE: 128 MMHG

## 2022-01-14 DIAGNOSIS — E78.5 HYPERLIPIDEMIA, UNSPECIFIED: ICD-10-CM

## 2022-01-14 DIAGNOSIS — R94.31 ABNORMAL ELECTROCARDIOGRAM [ECG] [EKG]: ICD-10-CM

## 2022-01-14 DIAGNOSIS — R92.2 INCONCLUSIVE MAMMOGRAM: ICD-10-CM

## 2022-01-14 DIAGNOSIS — E66.9 OBESITY, UNSPECIFIED: ICD-10-CM

## 2022-01-14 LAB
BASOPHILS # BLD AUTO: 0.07 K/UL
BASOPHILS NFR BLD AUTO: 1.3 %
BILIRUB UR QL STRIP: NORMAL
CLARITY UR: CLEAR
COLLECTION METHOD: NORMAL
EOSINOPHIL # BLD AUTO: 0.15 K/UL
EOSINOPHIL NFR BLD AUTO: 2.8 %
GLUCOSE UR-MCNC: NORMAL
HCG UR QL: 0.2 EU/DL
HCT VFR BLD CALC: 44.4 %
HGB BLD-MCNC: 14.2 G/DL
HGB UR QL STRIP.AUTO: NORMAL
IMM GRANULOCYTES NFR BLD AUTO: 0.2 %
KETONES UR-MCNC: NORMAL
LEUKOCYTE ESTERASE UR QL STRIP: NORMAL
LYMPHOCYTES # BLD AUTO: 1.65 K/UL
LYMPHOCYTES NFR BLD AUTO: 30.9 %
MAN DIFF?: NORMAL
MCHC RBC-ENTMCNC: 29.2 PG
MCHC RBC-ENTMCNC: 32 GM/DL
MCV RBC AUTO: 91.4 FL
MONOCYTES # BLD AUTO: 0.57 K/UL
MONOCYTES NFR BLD AUTO: 10.7 %
NEUTROPHILS # BLD AUTO: 2.89 K/UL
NEUTROPHILS NFR BLD AUTO: 54.1 %
NITRITE UR QL STRIP: NORMAL
PH UR STRIP: 6
PLATELET # BLD AUTO: 262 K/UL
PROT UR STRIP-MCNC: NORMAL
RBC # BLD: 4.86 M/UL
RBC # FLD: 13.9 %
SP GR UR STRIP: 1.02
WBC # FLD AUTO: 5.34 K/UL

## 2022-01-14 PROCEDURE — 93000 ELECTROCARDIOGRAM COMPLETE: CPT | Mod: 59

## 2022-01-14 PROCEDURE — 99396 PREV VISIT EST AGE 40-64: CPT | Mod: 25

## 2022-01-14 PROCEDURE — 90686 IIV4 VACC NO PRSV 0.5 ML IM: CPT

## 2022-01-14 PROCEDURE — 81003 URINALYSIS AUTO W/O SCOPE: CPT | Mod: QW

## 2022-01-14 PROCEDURE — G0444 DEPRESSION SCREEN ANNUAL: CPT | Mod: 59

## 2022-01-14 PROCEDURE — G0008: CPT

## 2022-01-14 NOTE — HISTORY OF PRESENT ILLNESS
[FreeTextEntry1] : feels  good  on ppi only  on no rx for lipids  she is not using  cpap  she is not `exercising and  gained weight

## 2022-01-14 NOTE — HEALTH RISK ASSESSMENT
[Good] : ~his/her~  mood as  good [Never] : Never [Yes] : Yes [0] : 2) Feeling down, depressed, or hopeless: Not at all (0) [PHQ-2 Negative - No further assessment needed] : PHQ-2 Negative - No further assessment needed [Patient reported mammogram was normal] : Patient reported mammogram was normal [Patient reported PAP Smear was normal] : Patient reported PAP Smear was normal [Patient reported bone density results were normal] : Patient reported bone density results were normal [None] : None [] :  [Fully functional (bathing, dressing, toileting, transferring, walking, feeding)] : Fully functional (bathing, dressing, toileting, transferring, walking, feeding) [Fully functional (using the telephone, shopping, preparing meals, housekeeping, doing laundry, using] : Fully functional and needs no help or supervision to perform IADLs (using the telephone, shopping, preparing meals, housekeeping, doing laundry, using transportation, managing medications and managing finances) [de-identified] : no exercise [de-identified] : healthy  wt is up a little [Patient reported colonoscopy was normal] : Patient reported colonoscopy was normal [Change in mental status noted] : No change in mental status noted [Reports changes in hearing] : Reports no changes in hearing [Reports changes in vision] : Reports no changes in vision [Reports changes in dental health] : Reports no changes in dental health [MammogramDate] : 3 years [PapSmearDate] : few years ago [BoneDensityDate] : 2017 [ColonoscopyDate] : 2017

## 2022-01-16 DIAGNOSIS — E78.00 PURE HYPERCHOLESTEROLEMIA, UNSPECIFIED: ICD-10-CM

## 2022-01-16 LAB
ALBUMIN SERPL ELPH-MCNC: 4.6 G/DL
ALP BLD-CCNC: 100 U/L
ALT SERPL-CCNC: 16 U/L
ANION GAP SERPL CALC-SCNC: 15 MMOL/L
AST SERPL-CCNC: 21 U/L
BILIRUB SERPL-MCNC: 0.4 MG/DL
BUN SERPL-MCNC: 14 MG/DL
CALCIUM SERPL-MCNC: 10.5 MG/DL
CHLORIDE SERPL-SCNC: 107 MMOL/L
CHOLEST SERPL-MCNC: 260 MG/DL
CO2 SERPL-SCNC: 24 MMOL/L
CREAT SERPL-MCNC: 0.84 MG/DL
ESTIMATED AVERAGE GLUCOSE: 126 MG/DL
GLUCOSE SERPL-MCNC: 100 MG/DL
HBA1C MFR BLD HPLC: 6 %
HDLC SERPL-MCNC: 77 MG/DL
LDLC SERPL CALC-MCNC: 165 MG/DL
NONHDLC SERPL-MCNC: 183 MG/DL
POTASSIUM SERPL-SCNC: 5.2 MMOL/L
PROT SERPL-MCNC: 7.4 G/DL
SODIUM SERPL-SCNC: 146 MMOL/L
TRIGL SERPL-MCNC: 89 MG/DL

## 2022-01-16 RX ORDER — ATORVASTATIN CALCIUM 20 MG/1
20 TABLET, FILM COATED ORAL
Qty: 90 | Refills: 3 | Status: ACTIVE | COMMUNITY
Start: 2022-01-16 | End: 1900-01-01

## 2022-01-19 NOTE — PHYSICAL THERAPY INITIAL EVALUATION ADULT - COORDINATION ASSESSED, REHAB EVAL
----- Message from Betty Feliciano sent at 1/19/2022  2:02 PM CST -----  Contact: Kenna melton/ [facility]   Requesting an RX refill or new RX.  Is this a refill or new RX:   RX name and strength (copy/paste from chart):  dexAMETHasone (DECADRON) 4 MG Tab  Is this a 30 day or 90 day RX:   Patient advised that in the future they can use their [facility] account to request a refill?:    Patient advised that RX refills can take up to 72 hours?:  yes  Pharmacy name and phone # (copy/paste from chart):    [facility] - [facility] [address]  [address] [address]  Phone: [phone] Fax: [phone]      Comments:          Requesting an RX refill or new RX.  Is this a refill or new RX:   RX name and strength (copy/paste from chart):  tiZANidine (ZANAFLEX) 4 MG tablet  Is this a 30 day or 90 day RX:   Patient advised that in the future they can use their [facility] account to request a refill?:    Patient advised that RX refills can take up to 72 hours?:    Pharmacy name and phone # (copy/paste from chart):  [facility] - [facility] [address]  [address] [address]  Phone: [phone] Fax: [phone]  Comments:          Requesting an RX refill or new RX.  Is this a refill or new RX:   RX name and strength (copy/paste from chart):  SYNTHROID 200 mcg tablet  Is this a 30 day or 90 day RX:   Patient advised that in the future they can use their [facility] account to request a refill?:    Patient advised that RX refills can take up to 72 hours?:    Pharmacy name and phone # (copy/paste from chart):  [facility] - [facility] [address]  [address] [id number]  Phone: [phone] Fax: [phone]  Comments:               intact/finger to nose

## 2022-07-07 ENCOUNTER — NON-APPOINTMENT (OUTPATIENT)
Age: 62
End: 2022-07-07

## 2022-07-11 RX ORDER — OMEPRAZOLE 40 MG/1
40 CAPSULE, DELAYED RELEASE ORAL
Qty: 90 | Refills: 0 | Status: ACTIVE | COMMUNITY
Start: 2020-07-15 | End: 1900-01-01

## 2022-07-26 NOTE — H&P PST ADULT - LAST ECHOCARDIOGRAM
Head,  normocephalic,  atraumatic,  Face,  Face within normal limits,  Ears,  External ears within normal limits,  Nose/Nasopharynx,  External nose  normal appearance,  nares patent,  no nasal discharge,  Mouth and Throat,  Oral cavity appearance normal,  Breath odor normal,  Lips,  Appearance normal
4 years ago-normal as per pt

## 2022-10-04 NOTE — ASU PREOP CHECKLIST - TAMPON REMOVED
October 4, 2022       Nathaniel Redd DO  [address] [facility] [address]  Via In Basket      Patient: Taurus Newberry   YOB: 1950   Date of Visit: 10/4/2022       Dear Dr. Redd:    Thank you for referring Taurus Newberry to me for evaluation. Below are my notes for this visit with him.    If you have questions, please do not hesitate to call me. I look forward to following your patient along with you.      Sincerely,        Dai Zafar CNP        CC: No Recipients  Dai Zafar CNP  10/4/2022  9:41 AM  Signed  Subjective   Patient ID: Taurus is a 72 year old     Chief Complaint   Patient presents with   • Follow-up     HPI:    Taurus Newberry is a 72-year-old male who has been seen and evaluated by our cardiology services during a hospitalization in June 2022.  He has a past medical history of chronic diastolic congestive heart failure and essential hypertension.  At the time of presentation, patient presented with symptoms of palpitations and was found to be in atrial flutter with rapid ventricular response.  He was seen and evaluated by our cardiology services during hospitalization.  He is currently an established patient of Dr. Yogesh Lee, advanced heart failure.  Patient has known history of chronic diastolic congestive heart failure.  He is currently on GDMT and compliant.    At the time of hospitalization in June 2022, patient spontaneously converted to normal sinus rhythm.  He reports since hospital discharge, he has been having intermittent episodes of palpitation but reports increased frequency as of late.  Had a ER visit at the end of August for palpitations but by the time of ER arrival, symptoms had subsided.  He had an EKG obtained in the emergency department that was reviewed and personally interpreted as normal sinus rhythm.  He reports a recent occurrence of palpitations that occurred on September 30, 2022.  He reports some symptoms associated with palpitations.   Primarily reports symptoms of fatigue after every episode.  He does report compliance with rate control strategy.  Currently on diltiazem and carvedilol.    PMHx:  Past Medical History:   Diagnosis Date   • Atrial flutter (CMS/HCC)    • Congestive cardiac failure (CMS/HCC)    • Essential (primary) hypertension        PSH:  Past Medical History:   Diagnosis Date   • Atrial flutter (CMS/HCC)    • Congestive cardiac failure (CMS/HCC)    • Essential (primary) hypertension        Family Hx:  Family History   Family history unknown: Yes       Social Hx:  Social History     Socioeconomic History   • Marital status: /Civil Union     Spouse name: Not on file   • Number of children: Not on file   • Years of education: Not on file   • Highest education level: Not on file   Occupational History   • Not on file   Tobacco Use   • Smoking status: Never Smoker   • Smokeless tobacco: Never Used   Substance and Sexual Activity   • Alcohol use: Never   • Drug use: Yes     Types: Marijuana     Comment: 1 per month   • Sexual activity: Not on file   Other Topics Concern   • Not on file   Social History Narrative   • Not on file     Social Determinants of Health     Financial Resource Strain: Not on file   Food Insecurity: Not on file   Transportation Needs: Not on file   Physical Activity: Not on file   Stress: Not on file   Social Connections: Not on file   Intimate Partner Violence: Not At Risk   • Social Determinants: Intimate Partner Violence Past Fear: No   • Social Determinants: Intimate Partner Violence Current Fear: No       Allergies:  ALLERGIES:   Allergen Reactions   • Hydrochlorothiazide RASH and Other (See Comments)   • Sacubitril-Valsartan HIVES       Medications:  Current Outpatient Medications   Medication Sig Dispense Refill   • apixaBAN (ELIQUIS) 5 MG Tab Take 1 tablet by mouth 2 times daily. 60 tablet 0   • losartan (COZAAR) 50 MG tablet Take 1 tablet by mouth daily. (Patient taking differently: 25 mg.) 30  tablet 0   • spironolactone (ALDACTONE) 25 MG tablet Take 1 tablet by mouth daily. 30 tablet 0   • bumetanide (BUMEX) 0.5 MG tablet Take 0.5 mg by mouth 2 times daily.     • carvedilol (COREG) 3.125 MG tablet Take 3.125 mg by mouth 2 times daily.     • empagliflozin (JARDIANCE) 10 MG tablet Take 1 tablet by mouth daily (before breakfast). 30 tablet 11   • meclizine (ANTIVERT) 25 MG tablet Take 1-2 tablets by mouth 3 times daily as needed for Dizziness. 15 tablet 0   • cloNIDine (CATAPRES) 0.1 MG tablet Take 0.1 mg by mouth 3 times daily.     • allopurinol (ZYLOPRIM) 300 MG tablet Take 300 mg by mouth daily.     • dilTIAZem (CARDIZEM CD) 240 MG 24 hr capsule Take 120 mg by mouth daily.       No current facility-administered medications for this visit.       Review of Systems:  Review of Systems   Constitutional: Positive for malaise/fatigue. Negative for chills and fever.   HENT: Negative.  Negative for congestion and hearing loss.    Eyes: Negative.  Negative for blurred vision, double vision, photophobia and visual disturbance.   Cardiovascular: Positive for palpitations (Intermittently.  Most recent symptoms September 30th). Negative for chest pain, dyspnea on exertion, irregular heartbeat, leg swelling, near-syncope, orthopnea, paroxysmal nocturnal dyspnea and syncope.   Respiratory: Negative.  Negative for cough, shortness of breath and wheezing.    Endocrine: Negative.  Negative for polydipsia, polyphagia and polyuria.   Hematologic/Lymphatic: Does not bruise/bleed easily.   Skin: Negative.  Negative for color change, poor wound healing and rash.   Musculoskeletal: Negative.  Negative for back pain, joint pain and myalgias.   Gastrointestinal: Negative.  Negative for abdominal pain, constipation, diarrhea, nausea and vomiting.   Genitourinary: Negative.  Negative for dysuria, hesitancy and urgency.   Neurological: Negative.  Negative for dizziness, light-headedness, vertigo and weakness.    Psychiatric/Behavioral: Negative.  Negative for depression and suicidal ideas.   Allergic/Immunologic: Negative.  Negative for persistent infections.   All other systems reviewed and are negative.      Physical Exam:  Visit Vitals  /88 (BP Location: RUE - Right upper extremity, Patient Position: Sitting, Cuff Size: Large Adult)   Pulse 78   Ht 6' (1.829 m)   Wt 118.6 kg (261 lb 9.2 oz)   BMI 35.48 kg/m²       General : alert and oriented, in no apparent distress  HEENT: mucosa is moist, no JVD, no carotid bruit  Cardiac: RRR, nl S1 and S2, no murmur  Respiratory: clear to auscultation, no wheezing  Abdomen : soft, non tender  Extremities: intact pulses, no edema  Neuro: alert and oriented  Musculoskeletal : normal gait, normal tone  Skin: warm, dry  Psychiatric: normal affect    Admission on 08/31/2022, Discharged on 08/31/2022   Component Date Value Ref Range Status   • Sodium 08/30/2022 142  135 - 145 mmol/L Final   • Potassium 08/30/2022 3.7  3.4 - 5.1 mmol/L Final   • Chloride 08/30/2022 105  97 - 110 mmol/L Final   • Carbon Dioxide 08/30/2022 29  21 - 32 mmol/L Final   • Anion Gap 08/30/2022 12  7 - 19 mmol/L Final   • Glucose 08/30/2022 107 (A) 70 - 99 mg/dL Final   • BUN 08/30/2022 25 (A) 6 - 20 mg/dL Final   • Creatinine 08/30/2022 1.52 (A) 0.67 - 1.17 mg/dL Final   • Glomerular Filtration Rate 08/30/2022 48 (A) >=60 Final    eGFR 30-59 mL/min/1.73m2 = Moderate decrease in kidney function. Stage 3 CKD (chronic kidney disease) or moderate kidney disease. Estimated GFR calculated using the CKD-EPI-R (2021) equation that does not include race in the creatinine calculation.   • BUN/ Creatinine Ratio 08/30/2022 16  7 - 25 Final   • Calcium 08/30/2022 8.9  8.4 - 10.2 mg/dL Final   • Bilirubin, Total 08/30/2022 0.6  0.2 - 1.0 mg/dL Final   • GOT/AST 08/30/2022 20  <=37 Units/L Final   • GPT/ALT 08/30/2022 22  <64 Units/L Final   • Alkaline Phosphatase 08/30/2022 85  45 - 117 Units/L Final   • Albumin  08/30/2022 3.4 (A) 3.6 - 5.1 g/dL Final   • Protein, Total 08/30/2022 7.3  6.4 - 8.2 g/dL Final   • Globulin 08/30/2022 3.9  2.0 - 4.0 g/dL Final   • A/G Ratio 08/30/2022 0.9 (A) 1.0 - 2.4 Final   • Troponin I, High Sensitivity 08/30/2022 29  <77 ng/L Final   • Ventricular Rate EKG/Min (BPM) 08/30/2022 75   Final   • Atrial Rate (BPM) 08/30/2022 75   Final   • AR-Interval (MSEC) 08/30/2022 140   Final   • QRS-Interval (MSEC) 08/30/2022 88   Final   • QT-Interval (MSEC) 08/30/2022 428   Final   • QTc 08/30/2022 478   Final   • P Axis (Degrees) 08/30/2022 92   Final   • R Axis (Degrees) 08/30/2022 -36   Final   • T Axis (Degrees) 08/30/2022 49   Final   • REPORT TEXT 08/30/2022    Final                    Value:Normal sinus rhythm  Left axis deviation  Possible  Anterior infarct  , age undetermined  Confirmed by LETA HENAO MD ([id number]) on 8/31/2022 8:14:35 PM     • WBC 08/30/2022 8.1  4.2 - 11.0 K/mcL Final   • RBC 08/30/2022 4.27 (A) 4.50 - 5.90 mil/mcL Final   • HGB 08/30/2022 12.5 (A) 13.0 - 17.0 g/dL Final   • HCT 08/30/2022 37.6 (A) 39.0 - 51.0 % Final   • MCV 08/30/2022 88.1  78.0 - 100.0 fl Final   • MCH 08/30/2022 29.3  26.0 - 34.0 pg Final   • MCHC 08/30/2022 33.2  32.0 - 36.5 g/dL Final   • RDW-CV 08/30/2022 16.6 (A) 11.0 - 15.0 % Final   • RDW-SD 08/30/2022 53.2 (A) 39.0 - 50.0 fL Final   • PLT 08/30/2022 220  140 - 450 K/mcL Final   • NRBC 08/30/2022 0  <=0 /100 WBC Final   • Neutrophil, Percent 08/30/2022 57  % Final   • Lymphocytes, Percent 08/30/2022 33  % Final   • Mono, Percent 08/30/2022 7  % Final   • Eosinophils, Percent 08/30/2022 2  % Final   • Basophils, Percent 08/30/2022 1  % Final   • Immature Granulocytes 08/30/2022 0  % Final   • Absolute Neutrophils 08/30/2022 4.6  1.8 - 7.7 K/mcL Final   • Absolute Lymphocytes 08/30/2022 2.7  1.0 - 4.0 K/mcL Final   • Absolute Monocytes 08/30/2022 0.6  0.3 - 0.9 K/mcL Final   • Absolute Eosinophils  08/30/2022 0.2  0.0 - 0.5 K/mcL Final   • Absolute  Basophils 08/30/2022 0.1  0.0 - 0.3 K/mcL Final   • Absolute Immmature Granulocytes 08/30/2022 0.0  0.0 - 0.2 K/mcL Final   Admission on 06/17/2022, Discharged on 06/17/2022   Component Date Value Ref Range Status   • Sodium 06/17/2022 137  135 - 145 mmol/L Final   • Potassium 06/17/2022 3.8  3.4 - 5.1 mmol/L Final   • Chloride 06/17/2022 104  97 - 110 mmol/L Final   • Carbon Dioxide 06/17/2022 25  21 - 32 mmol/L Final   • Anion Gap 06/17/2022 12  7 - 19 mmol/L Final   • Glucose 06/17/2022 119 (A) 70 - 99 mg/dL Final   • BUN 06/17/2022 23 (A) 6 - 20 mg/dL Final   • Creatinine 06/17/2022 1.62 (A) 0.67 - 1.17 mg/dL Final   • Glomerular Filtration Rate 06/17/2022 45 (A) >=60 Final    eGFR 30-59 mL/min/1.73m2 = Moderate decrease in kidney function. Stage 3 CKD (chronic kidney disease) or moderate kidney disease. Estimated GFR calculated using the CKD-EPI-R (2021) equation that does not include race in the creatinine calculation.   • BUN/ Creatinine Ratio 06/17/2022 14  7 - 25 Final   • Calcium 06/17/2022 9.3  8.4 - 10.2 mg/dL Final   • Bilirubin, Total 06/17/2022 0.5  0.2 - 1.0 mg/dL Final   • GOT/AST 06/17/2022 17  <=37 Units/L Final   • GPT/ALT 06/17/2022 23  <64 Units/L Final   • Alkaline Phosphatase 06/17/2022 94  45 - 117 Units/L Final   • Albumin 06/17/2022 3.6  3.6 - 5.1 g/dL Final   • Protein, Total 06/17/2022 7.4  6.4 - 8.2 g/dL Final   • Globulin 06/17/2022 3.8  2.0 - 4.0 g/dL Final   • A/G Ratio 06/17/2022 0.9 (A) 1.0 - 2.4 Final   • Troponin I, High Sensitivity 06/17/2022 23  <77 ng/L Final   • Ventricular Rate EKG/Min (BPM) 06/17/2022 83   Final   • Atrial Rate (BPM) 06/17/2022 83   Final   • UT-Interval (MSEC) 06/17/2022 160   Final   • QRS-Interval (MSEC) 06/17/2022 96   Final   • QT-Interval (MSEC) 06/17/2022 400   Final   • QTc 06/17/2022 470   Final   • P Axis (Degrees) 06/17/2022 42   Final   • R Axis (Degrees) 06/17/2022 -40   Final   • T Axis (Degrees) 06/17/2022 28   Final   • REPORT TEXT  06/17/2022    Final                    Value:Sinus rhythm  with  premature supraventricular complexes  Left axis deviation  Abnormal ECG  When compared with ECG of  17-JUN-2022 04:12,  No significant change was found  Confirmed by GUSTAVO MONSIVAIS, MANJIT ([id number]) on 6/17/2022 1:49:33 PM     • WBC 06/17/2022 7.9  4.2 - 11.0 K/mcL Final   • RBC 06/17/2022 4.30 (A) 4.50 - 5.90 mil/mcL Final   • HGB 06/17/2022 12.3 (A) 13.0 - 17.0 g/dL Final   • HCT 06/17/2022 36.7 (A) 39.0 - 51.0 % Final   • MCV 06/17/2022 85.3  78.0 - 100.0 fl Final   • MCH 06/17/2022 28.6  26.0 - 34.0 pg Final   • MCHC 06/17/2022 33.5  32.0 - 36.5 g/dL Final   • RDW-CV 06/17/2022 15.3 (A) 11.0 - 15.0 % Final   • RDW-SD 06/17/2022 47.2  39.0 - 50.0 fL Final   • PLT 06/17/2022 211  140 - 450 K/mcL Final   • NRBC 06/17/2022 0  <=0 /100 WBC Final   • Neutrophil, Percent 06/17/2022 54  % Final   • Lymphocytes, Percent 06/17/2022 36  % Final   • Mono, Percent 06/17/2022 8  % Final   • Eosinophils, Percent 06/17/2022 1  % Final   • Basophils, Percent 06/17/2022 1  % Final   • Immature Granulocytes 06/17/2022 0  % Final   • Absolute Neutrophils 06/17/2022 4.4  1.8 - 7.7 K/mcL Final   • Absolute Lymphocytes 06/17/2022 2.8  1.0 - 4.0 K/mcL Final   • Absolute Monocytes 06/17/2022 0.6  0.3 - 0.9 K/mcL Final   • Absolute Eosinophils  06/17/2022 0.1  0.0 - 0.5 K/mcL Final   • Absolute Basophils 06/17/2022 0.0  0.0 - 0.3 K/mcL Final   • Absolute Immmature Granulocytes 06/17/2022 0.0  0.0 - 0.2 K/mcL Final   Admission on 06/08/2022, Discharged on 06/09/2022   Component Date Value Ref Range Status   • Ventricular Rate EKG/Min (BPM) 06/08/2022 135   Final   • Atrial Rate (BPM) 06/08/2022 258   Final   • QRS-Interval (MSEC) 06/08/2022 108   Final   • QT-Interval (MSEC) 06/08/2022 348   Final   • QTc 06/08/2022 522   Final   • R Axis (Degrees) 06/08/2022 14   Final   • T Axis (Degrees) 06/08/2022 -17   Final   • REPORT TEXT 06/08/2022    Final                    Value:Atrial  flutter  with variable AV block  with premature ventricular or aberrantly conducted complexes  Cannot rule out  Anterior infarct  (cited on or before  08-JUN-2022)  ST And  T wave abnormality, consider inferior ischemia  Abnormal ECG  When compared with ECG of  13-DEC-2021 09:36,  Significant changes have occurred  Confirmed by NATI BAPTISTE MD ([id number]) on 6/9/2022 2:26:58 PM     • Sodium 06/08/2022 143  135 - 145 mmol/L Final   • Potassium 06/08/2022 2.7 (A) 3.4 - 5.1 mmol/L Final   • Chloride 06/08/2022 104  97 - 110 mmol/L Final   • Carbon Dioxide 06/08/2022 27  21 - 32 mmol/L Final   • Anion Gap 06/08/2022 15  7 - 19 mmol/L Final   • Glucose 06/08/2022 123 (A) 70 - 99 mg/dL Final   • BUN 06/08/2022 19  6 - 20 mg/dL Final   • Creatinine 06/08/2022 1.39 (A) 0.67 - 1.17 mg/dL Final   • Glomerular Filtration Rate 06/08/2022 54 (A) >=60 Final    eGFR 30-59 mL/min/1.73m2 = Moderate decrease in kidney function. Stage 3 CKD (chronic kidney disease) or moderate kidney disease. Estimated GFR calculated using the CKD-EPI-R (2021) equation that does not include race in the creatinine calculation.   • BUN/ Creatinine Ratio 06/08/2022 14  7 - 25 Final   • Calcium 06/08/2022 9.1  8.4 - 10.2 mg/dL Final   • Bilirubin, Total 06/08/2022 0.8  0.2 - 1.0 mg/dL Final   • GOT/AST 06/08/2022 18  <=37 Units/L Final   • GPT/ALT 06/08/2022 21  <64 Units/L Final   • Alkaline Phosphatase 06/08/2022 101  45 - 117 Units/L Final   • Albumin 06/08/2022 3.4 (A) 3.6 - 5.1 g/dL Final   • Protein, Total 06/08/2022 7.2  6.4 - 8.2 g/dL Final   • Globulin 06/08/2022 3.8  2.0 - 4.0 g/dL Final   • A/G Ratio 06/08/2022 0.9 (A) 1.0 - 2.4 Final   • Troponin I, High Sensitivity 06/08/2022 38  <77 ng/L Final   • Magnesium 06/08/2022 1.9  1.7 - 2.4 mg/dL Final   • WBC 06/08/2022 7.2  4.2 - 11.0 K/mcL Final   • RBC 06/08/2022 4.81  4.50 - 5.90 mil/mcL Final   • HGB 06/08/2022 13.5  13.0 - 17.0 g/dL Final   • HCT 06/08/2022 41.2  39.0 - 51.0 % Final   • MCV  06/08/2022 85.7  78.0 - 100.0 fl Final   • MCH 06/08/2022 28.1  26.0 - 34.0 pg Final   • MCHC 06/08/2022 32.8  32.0 - 36.5 g/dL Final   • RDW-CV 06/08/2022 15.1 (A) 11.0 - 15.0 % Final   • RDW-SD 06/08/2022 46.6  39.0 - 50.0 fL Final   • PLT 06/08/2022 194  140 - 450 K/mcL Final   • NRBC 06/08/2022 0  <=0 /100 WBC Final   • Neutrophil, Percent 06/08/2022 45  % Final   • Lymphocytes, Percent 06/08/2022 43  % Final   • Mono, Percent 06/08/2022 7  % Final   • Eosinophils, Percent 06/08/2022 4  % Final   • Basophils, Percent 06/08/2022 1  % Final   • Immature Granulocytes 06/08/2022 0  % Final   • Absolute Neutrophils 06/08/2022 3.3  1.8 - 7.7 K/mcL Final   • Absolute Lymphocytes 06/08/2022 3.1  1.0 - 4.0 K/mcL Final   • Absolute Monocytes 06/08/2022 0.5  0.3 - 0.9 K/mcL Final   • Absolute Eosinophils  06/08/2022 0.3  0.0 - 0.5 K/mcL Final   • Absolute Basophils 06/08/2022 0.1  0.0 - 0.3 K/mcL Final   • Absolute Immmature Granulocytes 06/08/2022 0.0  0.0 - 0.2 K/mcL Final   • GLUCOSE, BEDSIDE - POINT OF CARE 06/08/2022 104 (A) 70 - 99 mg/dL Final   • NT-proBNP 06/08/2022 230 (A) <=125 pg/mL Final   • Rapid SARS-COV-2 by PCR 06/08/2022 Not Detected  Not Detected / Detected / Presumptive Positive / Inhibitors present Final   • Influenza A by PCR 06/08/2022 Not Detected  Not Detected Final   • Influenza B by PCR 06/08/2022 Not Detected  Not Detected Final   • RSV BY PCR 06/08/2022 Not Detected  Not Detected Final   • Isolation Guidelines 06/08/2022    Final    Do not use this test result as the sole decision-maker for discontinuation of isolation.   Clinical evaluation should be considered for other respiratory illness requiring transmission-based isolation.    -    No fever (<99.0 F/37.2 C) for at least 24 hours without the use of fever-reducing medications    AND  -    Respiratory symptoms have improved or resolved (e.g. cough, shortness of breath)     AND  -    COVID-19 negative test    See COVID-19 Deisolation Resource  Guide   • Procedural Comment 06/08/2022    Final    SARS-COV-2 nucleic acid has not been detected indicating the absence of COVID-19.    This test was performed using the [employer] Xpert Xpress SARS-CoV-2/Flu/RSV RT-PCR test that has been given Emergency Use Authorization (EUA) by the [place] Food and Drug Administration (FDA).  These tests are considered definitive and do not need to be confirmed by another method.   • Chloride, Urine 06/08/2022 54  mmol/L Final    Reference range not available   • Creatinine, Urine 06/08/2022 66.55  mg/dL Final   • Protein, Urine 06/08/2022 19 (A) <12 mg/dL Final   • Sodium, Urine 06/08/2022 74  mmol/L Final    Reference range not available   • Protein, Urine 06/08/2022 17 (A) <12 mg/dL Final   • Urine Protein Electropheresis Path* 06/08/2022   Urine concentrated 50 times.  Absence of abnormal protein bands.       Final   • GLUCOSE, BEDSIDE - POINT OF CARE 06/08/2022 95  70 - 99 mg/dL Final   • GLUCOSE, BEDSIDE - POINT OF CARE 06/08/2022 89  70 - 99 mg/dL Final   • Sodium 06/09/2022 144  135 - 145 mmol/L Final   • Potassium 06/09/2022 3.5  3.4 - 5.1 mmol/L Final   • Chloride 06/09/2022 107  97 - 110 mmol/L Final   • Carbon Dioxide 06/09/2022 27  21 - 32 mmol/L Final   • Anion Gap 06/09/2022 14  7 - 19 mmol/L Final   • Glucose 06/09/2022 95  70 - 99 mg/dL Final   • BUN 06/09/2022 14  6 - 20 mg/dL Final   • Creatinine 06/09/2022 1.20 (A) 0.67 - 1.17 mg/dL Final   • Glomerular Filtration Rate 06/09/2022 64  >=60 Final    eGFR results = or >60 mL/min/1.73m2 = Normal kidney function. Estimated GFR calculated using the CKD-EPI-R (2021) equation that does not include race in the creatinine calculation.   • BUN/ Creatinine Ratio 06/09/2022 12  7 - 25 Final   • Calcium 06/09/2022 9.0  8.4 - 10.2 mg/dL Final   • Magnesium 06/09/2022 2.1  1.7 - 2.4 mg/dL Final   • Phosphorus 06/09/2022 2.7  2.4 - 4.7 mg/dL Final   • Protein, Total 06/09/2022 6.6  6.4 - 8.2 g/dL Final   • Total Globulin  06/09/2022 2.9  2.1 - 4.2 g/dL Final   • Albumin 06/09/2022 3.7  3.5 - 4.9 g/dL Final   • Alpha 1 06/09/2022 0.3  0.2 - 0.4 g/dL Final   • Alpha 2 06/09/2022 0.7  0.5 - 0.9 g/dL Final   • Beta 06/09/2022 0.8  0.7 - 1.2 g/dL Final   • Gamma 06/09/2022 1.0  0.7 - 1.7 g/dL Final   • Serum Protein Electrophoresis Path* 06/09/2022   Essentially normal electrophoretic pattern.       Final   • Hepatitis A Antibody, IgM 06/09/2022 Negative  Negative Final   • Hepatitis A Interpretation 06/09/2022    Final    No evidence of acute Hepatitis A infection   • Hepatitis B Surface Antigen 06/09/2022 Negative  Negative Final   • Hepatitis B Core Antibody, IgM 06/09/2022 Negative  Negative Final   • Hepatitis B Interpretation 06/09/2022    Final    No evidence of acute Hepatitis B infection.   • Hepatitis C Antibody 06/09/2022 Negative  Negative Final   • WBC 06/09/2022 7.5  4.2 - 11.0 K/mcL Final   • RBC 06/09/2022 4.51  4.50 - 5.90 mil/mcL Final   • HGB 06/09/2022 13.0  13.0 - 17.0 g/dL Final   • HCT 06/09/2022 38.6 (A) 39.0 - 51.0 % Final   • MCV 06/09/2022 85.6  78.0 - 100.0 fl Final   • MCH 06/09/2022 28.8  26.0 - 34.0 pg Final   • MCHC 06/09/2022 33.7  32.0 - 36.5 g/dL Final   • RDW-CV 06/09/2022 15.2 (A) 11.0 - 15.0 % Final   • RDW-SD 06/09/2022 47.2  39.0 - 50.0 fL Final   • PLT 06/09/2022 188  140 - 450 K/mcL Final   • NRBC 06/09/2022 0  <=0 /100 WBC Final   • Neutrophil, Percent 06/09/2022 49  % Final   • Lymphocytes, Percent 06/09/2022 40  % Final   • Mono, Percent 06/09/2022 7  % Final   • Eosinophils, Percent 06/09/2022 3  % Final   • Basophils, Percent 06/09/2022 1  % Final   • Immature Granulocytes 06/09/2022 0  % Final   • Absolute Neutrophils 06/09/2022 3.7  1.8 - 7.7 K/mcL Final   • Absolute Lymphocytes 06/09/2022 3.0  1.0 - 4.0 K/mcL Final   • Absolute Monocytes 06/09/2022 0.6  0.3 - 0.9 K/mcL Final   • Absolute Eosinophils  06/09/2022 0.2  0.0 - 0.5 K/mcL Final   • Absolute Basophils 06/09/2022 0.1  0.0 - 0.3  K/mcL Final   • Absolute Immmature Granulocytes 06/09/2022 0.0  0.0 - 0.2 K/mcL Final   • Chloride, Urine 06/09/2022 84  mmol/L Final    Reference range not available   • Creatinine, Urine 06/09/2022 118.05  mg/dL Final   • Protein, Urine 06/09/2022 21 (A) <12 mg/dL Final   • Sodium, Urine 06/09/2022 99  mmol/L Final    Reference range not available   • Protein, Urine 06/09/2022 19 (A) <12 mg/dL Final   • Urine Protein Electropheresis Path* 06/09/2022   Urine concentrated 50 times.  Absence of abnormal protein bands.       Final   • GLUCOSE, BEDSIDE - POINT OF CARE 06/09/2022 87  70 - 99 mg/dL Final   • TSH 06/09/2022 5.097 (A) 0.350 - 5.000 mcUnits/mL Final   • GLUCOSE, BEDSIDE - POINT OF CARE 06/09/2022 97  70 - 99 mg/dL Final   Hospital Outpatient Visit on 03/30/2022   Component Date Value Ref Range Status   • AV Stenosis Severity Text 03/30/2022 Absent   Corrected   • Aortic Valve Area 03/30/2022 ---   Corrected   • AV Peak Gradient 03/30/2022 ---   Corrected   • AV Mean Gradient 03/30/2022 ---   Corrected   • AV Peak Velocity 03/30/2022 ---   Corrected   • AV Mean Velocity 03/30/2022 ---   Corrected   • Ejection Fraction 03/30/2022 65%   Corrected   Hospital Outpatient Visit on 03/26/2022   Component Date Value Ref Range Status   • Sodium 03/26/2022 141  135 - 145 mmol/L Final   • Potassium 03/26/2022 3.2 (A) 3.4 - 5.1 mmol/L Final   • Chloride 03/26/2022 105  98 - 107 mmol/L Final   • Carbon Dioxide 03/26/2022 27  21 - 32 mmol/L Final   • Anion Gap 03/26/2022 12  10 - 20 mmol/L Final   • Glucose 03/26/2022 106 (A) 70 - 99 mg/dL Final   • BUN 03/26/2022 18  6 - 20 mg/dL Final   • Creatinine 03/26/2022 1.28 (A) 0.67 - 1.17 mg/dL Final   • Glomerular Filtration Rate 03/26/2022 64  >=60 Final    eGFR results = or >60 mL/min/1.73m2 = Normal kidney function. Estimated GFR calculated using the 2009 CKD-EPI creatinine equation.     • BUN/ Creatinine Ratio 03/26/2022 14  7 - 25 Final   • Calcium 03/26/2022 8.8  8.4 -  10.2 mg/dL Final   • Bilirubin, Total 03/26/2022 0.6  0.2 - 1.0 mg/dL Final   • GOT/AST 03/26/2022 20  <=37 Units/L Final   • GPT/ALT 03/26/2022 26  <64 Units/L Final   • Alkaline Phosphatase 03/26/2022 88  45 - 117 Units/L Final   • Albumin 03/26/2022 3.3 (A) 3.6 - 5.1 g/dL Final   • Protein, Total 03/26/2022 7.1  6.4 - 8.2 g/dL Final   • Globulin 03/26/2022 3.8  2.0 - 4.0 g/dL Final   • A/G Ratio 03/26/2022 0.9 (A) 1.0 - 2.4 Final   • NT-proBNP 03/26/2022 444 (A) <=125 pg/mL Final   Admission on 12/13/2021, Discharged on 12/13/2021   Component Date Value Ref Range Status   • Sodium 12/13/2021 144  135 - 145 mmol/L Final   • Potassium 12/13/2021 3.4  3.4 - 5.1 mmol/L Final   • Chloride 12/13/2021 106  98 - 107 mmol/L Final   • Carbon Dioxide 12/13/2021 26  21 - 32 mmol/L Final   • Anion Gap 12/13/2021 15  10 - 20 mmol/L Final   • Glucose 12/13/2021 100 (A) 70 - 99 mg/dL Final   • BUN 12/13/2021 16  6 - 20 mg/dL Final   • Creatinine 12/13/2021 1.16  0.67 - 1.17 mg/dL Final   • Glomerular Filtration Rate 12/13/2021 72  >=60 Final    eGFR results = or >60 mL/min/1.73m2 = Normal kidney function. Estimated GFR calculated using the 2009 CKD-EPI creatinine equation.     • BUN/ Creatinine Ratio 12/13/2021 14  7 - 25 Final   • Calcium 12/13/2021 9.2  8.4 - 10.2 mg/dL Final   • Bilirubin, Total 12/13/2021 0.6  0.2 - 1.0 mg/dL Final   • GOT/AST 12/13/2021 18  <=37 Units/L Final   • GPT/ALT 12/13/2021 27  <64 Units/L Final   • Alkaline Phosphatase 12/13/2021 82  45 - 117 Units/L Final   • Albumin 12/13/2021 3.5 (A) 3.6 - 5.1 g/dL Final   • Protein, Total 12/13/2021 7.4  6.4 - 8.2 g/dL Final   • Globulin 12/13/2021 3.9  2.0 - 4.0 g/dL Final   • A/G Ratio 12/13/2021 0.9 (A) 1.0 - 2.4 Final   • Troponin I, High Sensitivity 12/13/2021 25  <77 ng/L Final   • Ventricular Rate EKG/Min (BPM) 12/13/2021 80   Final   • Atrial Rate (BPM) 12/13/2021 80   Final   • ME-Interval (MSEC) 12/13/2021 148   Final   • QRS-Interval (MSEC)  12/13/2021 96   Final   • QT-Interval (MSEC) 12/13/2021 400   Final   • QTc 12/13/2021 461   Final   • P Axis (Degrees) 12/13/2021 48   Final   • R Axis (Degrees) 12/13/2021 -38   Final   • T Axis (Degrees) 12/13/2021 6   Final   • REPORT TEXT 12/13/2021    Final                    Value:Normal sinus rhythm  Left axis deviation  Abnormal ECG  When compared with ECG of  29-DEC-2020 21:12,  Confirmed by JANET CAO MD ([id number]) on 12/14/2021 4:54:53 PM     • WBC 12/13/2021 6.1  4.2 - 11.0 K/mcL Final   • RBC 12/13/2021 4.38 (A) 4.50 - 5.90 mil/mcL Final   • HGB 12/13/2021 12.7 (A) 13.0 - 17.0 g/dL Final   • HCT 12/13/2021 38.4 (A) 39.0 - 51.0 % Final   • MCV 12/13/2021 87.7  78.0 - 100.0 fl Final   • MCH 12/13/2021 29.0  26.0 - 34.0 pg Final   • MCHC 12/13/2021 33.1  32.0 - 36.5 g/dL Final   • RDW-CV 12/13/2021 15.9 (A) 11.0 - 15.0 % Final   • RDW-SD 12/13/2021 50.7 (A) 39.0 - 50.0 fL Final   • PLT 12/13/2021 183  140 - 450 K/mcL Final   • NRBC 12/13/2021 0  <=0 /100 WBC Final   • Neutrophil, Percent 12/13/2021 48  % Final   • Lymphocytes, Percent 12/13/2021 39  % Final   • Mono, Percent 12/13/2021 9  % Final   • Eosinophils, Percent 12/13/2021 3  % Final   • Basophils, Percent 12/13/2021 1  % Final   • Immature Granulocytes 12/13/2021 0  % Final   • Absolute Neutrophils 12/13/2021 2.9  1.8 - 7.7 K/mcL Final   • Absolute Lymphocytes 12/13/2021 2.4  1.0 - 4.0 K/mcL Final   • Absolute Monocytes 12/13/2021 0.5  0.3 - 0.9 K/mcL Final   • Absolute Eosinophils  12/13/2021 0.2  0.0 - 0.5 K/mcL Final   • Absolute Basophils 12/13/2021 0.0  0.0 - 0.3 K/mcL Final   • Absolute Immmature Granulocytes 12/13/2021 0.0  0.0 - 0.2 K/mcL Final   • Rapid SARS-COV-2 by PCR 12/13/2021 Not Detected  Not Detected / Detected / Presumptive Positive / Inhibitors present Final   • Influenza A by PCR 12/13/2021 Not Detected  Not Detected Final   • Influenza B by PCR 12/13/2021 Not Detected  Not Detected Final   • RSV BY PCR 12/13/2021 Not Detected   Not Detected Final   • Isolation Guidelines 12/13/2021    Final    Do not use this test result as the sole decision-maker for discontinuation of isolation.   Clinical evaluation should be considered for other respiratory illness requiring transmission-based isolation.    -    No fever (<99.0 F/37.2 C) for at least 24 hours without the use of fever-reducing medications    AND  -    Respiratory symptoms have improved or resolved (e.g. cough, shortness of breath)     AND  -    COVID-19 negative test    See COVID-19 Deisolation Resource Guide   • Procedural Comment 12/13/2021    Final    SARS-COV-2 nucleic acid has not been detected indicating the absence of COVID-19.    This test was performed using the Linea Xpert Xpress SARS-CoV-2/Flu/RSV RT-PCR test that has been given Emergency Use Authorization (EUA) by the [place] Food and Drug Administration (FDA).  These tests are considered definitive and do not need to be confirmed by another method.     Diagnostic tests/imaging personally reviewed by me:    Transthoracic echocardiogram March 2022:  STUDY CONCLUSIONS  SUMMARY:     1. Left ventricle: The cavity size is normal. Wall thickness is mildly to     moderately increased. Systolic function is normal. The ejection     fraction was measured by visual estimation. Doppler parameters are     consistent with a restrictive filling pattern, indicative of decreased     left ventricular compliance and elevated left atrial pressure (grade 3     diastolic dysfunction). The ejection fraction is 65%.  2. Left atrium: The atrium is severely dilated.    Transthoracic echocardiogram November 2020:  STUDY CONCLUSIONS  SUMMARY:     1. Left ventricle: The cavity size is normal. Wall thickness is moderately     to severely increased. There is assymetric septal hypertrophy. The     ejection fraction was measured by visual estimation. Wall motion is     normal; there are no regional wall motion abnormalities. Features are      consistent with a pseudonormal left ventricular filling pattern, with     concomitant abnormal relaxation and increased filling pressure (grade 2     diastolic dysfunction). The ejection fraction is 55%.  2. Left atrium: The atrium is moderately dilated.     Transthoracic echocardiogram 2019:  STUDY CONCLUSIONS  SUMMARY:     1. Left ventricle: The cavity size is normal. Wall thickness is normal.     Systolic function is normal. The estimated ejection fraction is 55-60%.    Twelve-lead EK. Paroxysmal atrial flutter (CMS/HCC)    2. Hypertensive heart disease with acute on chronic diastolic congestive heart failure (CMS/HCC)    3. Benign essential hypertension        Assessment / Plan:    Paroxysmal atrial flutter  -Currently in normal sinus rhythm  -Had a recent ER visit for palpitations but by the time of ER admissions, symptoms have subsided.  He reports intermittent but frequent symptoms with last episode occurring on 2022  -He reports symptoms of fatigue after each episode  -Currently on a rate control strategy with diltiazem and carvedilol  -CHADSVASc Stroke Risk Score = 3 and currently on Eliquis for cardioembolic prophylaxis  -We will continue diltiazem and carvedilol.  Continue Eliquis for cardioembolic prophylaxis.  Patient willing to discuss other option for management including ablation.  Will refer to electrophysiology services for discussion for possible atrial flutter ablation.    Chronic diastolic congestive heart failure  -Patient compensated on exam  -Recent echocardiogram with preserved left ventricular function with no significant valvulopathy or regional wall motion abnormalities  -Currently on GDMT with carvedilol, losartan, Aldactone, empagliflozin and bumetanide.  Was previously on Entresto but patient reported reaction.  -We will continue GDMT.  Continue established outpatient follow-up with advanced heart failure    Essential hypertension  -Blood pressures  controlled  -Currently on carvedilol 3.125 mg twice daily, diltiazem 240 mg daily, losartan 50 mg daily and Aldactone 25 mg daily  -We will continue present management.  Daily blood pressure log.  Dietary sodium restriction.  Blood pressure management as per primary care services.    Follow-up as needed.    Dai Zafar, CNP     n/a

## 2022-10-20 ENCOUNTER — APPOINTMENT (OUTPATIENT)
Dept: ORTHOPEDIC SURGERY | Facility: CLINIC | Age: 62
End: 2022-10-20
Payer: COMMERCIAL

## 2022-10-20 VITALS — HEIGHT: 68 IN | WEIGHT: 200 LBS | BODY MASS INDEX: 30.31 KG/M2

## 2022-10-20 DIAGNOSIS — M18.12 UNILATERAL PRIMARY OSTEOARTHRITIS OF FIRST CARPOMETACARPAL JOINT, LEFT HAND: ICD-10-CM

## 2022-10-20 DIAGNOSIS — M79.646 PAIN IN UNSPECIFIED FINGER(S): ICD-10-CM

## 2022-10-20 PROCEDURE — J3490M: CUSTOM

## 2022-10-20 PROCEDURE — 20600 DRAIN/INJ JOINT/BURSA W/O US: CPT | Mod: LT

## 2022-10-20 PROCEDURE — 73140 X-RAY EXAM OF FINGER(S): CPT | Mod: LT

## 2022-10-20 PROCEDURE — 99214 OFFICE O/P EST MOD 30 MIN: CPT | Mod: 25

## 2022-10-24 PROBLEM — M18.12 PRIMARY OSTEOARTHRITIS OF FIRST CARPOMETACARPAL JOINT OF LEFT HAND: Status: ACTIVE | Noted: 2022-10-24

## 2022-10-24 NOTE — HISTORY OF PRESENT ILLNESS
[Gradual] : gradual [7] : 7 [5] : 5 [Dull/Aching] : dull/aching [Throbbing] : throbbing [Nothing helps with pain getting better] : Nothing helps with pain getting better [de-identified] : L thumb CMC pain and swelling \par She had fall 3 years ago [] : no [FreeTextEntry1] : left hand/ thumb [FreeTextEntry3] : few months  [FreeTextEntry5] : she is having pain and swelling  [de-identified] : activity

## 2022-10-24 NOTE — PHYSICAL EXAM
[de-identified] : L hand: \par +thumb CMC swelling \par +thumb CMC tenderness \par Decreased thumb ROM \par +Basal grind test\par \par Xrays OA

## 2022-11-09 NOTE — DISCHARGE NOTE PROVIDER - NSDCHHBASESERVICE_GEN_ALL_CORE
[facility] [address].  Radiology Department  [phone]      Date: 11/9/2022        MRI With Sedation Discharge Instructions      Go home and rest and restrict your activity the next 24 hours. You have been given sedating medications, so do not drive or drink alcohol today. Resume your previous diet and medications. You may return to work and resume normal activities tomorrow. Results of your MRI will be sent to your physician as soon as they become available.
Nursing/Occupational therapy/Physical therapy

## 2023-04-25 NOTE — PATIENT PROFILE ADULT - FALLEN IN THE PAST
[FreeTextEntry1] : strep (-)- if TC (+) give amoxil 500 bid x 10days \par likely viral- supportive treatment- gargle, fluids pain meds no

## 2023-09-23 NOTE — OCCUPATIONAL THERAPY INITIAL EVALUATION ADULT - RLE MMT, REHAB EVAL
September 23, 2023    Quick note:    Urology contacted regarding patient and her large kidney stone.    In quick review of the chart she is not immunosupressed and her urine is nitrite negative with 13 WBC, no fevers, and the CT scan shows hydronephrosis but no perinephric stranding.  There is no WBC at this time as it has not been resulted.    Given the size, location and creatinine elevation to 1.6 (per outside labs) discussed with Dr Fink that ureteral stent would be appropriate.  I am told by him that she looks otherwise well so given current information would plan on ureteral stent in the AM unless there were clinical changes (eg fever, clinical status change) or her WBC was overtly concerning for infection.    She is currently added on to the OR for 8am, please keep NPO and inform urology of any clinical changes.      Shreya Dangelo MD MPH  (she/her/hers)   of Urology  [facility]     decreased knee extension

## 2023-11-21 ENCOUNTER — NON-APPOINTMENT (OUTPATIENT)
Age: 63
End: 2023-11-21

## 2023-12-30 ENCOUNTER — NON-APPOINTMENT (OUTPATIENT)
Age: 63
End: 2023-12-30

## 2024-10-23 ENCOUNTER — APPOINTMENT (OUTPATIENT)
Facility: CLINIC | Age: 64
End: 2024-10-23

## 2024-10-23 VITALS
OXYGEN SATURATION: 94 % | WEIGHT: 217 LBS | SYSTOLIC BLOOD PRESSURE: 145 MMHG | DIASTOLIC BLOOD PRESSURE: 90 MMHG | TEMPERATURE: 98.5 F | HEIGHT: 67 IN | RESPIRATION RATE: 17 BRPM | HEART RATE: 81 BPM | BODY MASS INDEX: 34.06 KG/M2

## 2024-10-23 DIAGNOSIS — R10.11 RIGHT UPPER QUADRANT PAIN: ICD-10-CM

## 2024-10-23 DIAGNOSIS — K21.9 GASTRO-ESOPHAGEAL REFLUX DISEASE W/OUT ESOPHAGITIS: ICD-10-CM

## 2024-10-23 DIAGNOSIS — E78.00 PURE HYPERCHOLESTEROLEMIA, UNSPECIFIED: ICD-10-CM

## 2024-10-23 DIAGNOSIS — E78.5 HYPERLIPIDEMIA, UNSPECIFIED: ICD-10-CM

## 2024-10-23 DIAGNOSIS — G47.33 OBSTRUCTIVE SLEEP APNEA (ADULT) (PEDIATRIC): ICD-10-CM

## 2024-10-23 DIAGNOSIS — G89.29 RIGHT UPPER QUADRANT PAIN: ICD-10-CM

## 2024-10-23 DIAGNOSIS — K43.9 VENTRAL HERNIA W/OUT OBSTRUCTION OR GANGRENE: ICD-10-CM

## 2024-10-23 DIAGNOSIS — K44.9 DIAPHRAGMATIC HERNIA W/OUT OBSTRUCTION OR GANGRENE: ICD-10-CM

## 2024-10-23 DIAGNOSIS — R91.1 SOLITARY PULMONARY NODULE: ICD-10-CM

## 2024-10-23 DIAGNOSIS — Z99.89 DEPENDENCE ON OTHER ENABLING MACHINES AND DEVICES: ICD-10-CM

## 2024-10-23 DIAGNOSIS — J30.9 ALLERGIC RHINITIS, UNSPECIFIED: ICD-10-CM

## 2024-10-23 PROCEDURE — 99204 OFFICE O/P NEW MOD 45 MIN: CPT

## 2024-10-23 RX ORDER — OMEPRAZOLE 20 MG/1
20 TABLET, DELAYED RELEASE ORAL
Refills: 0 | Status: ACTIVE | COMMUNITY

## 2024-10-24 RX ORDER — FLUTICASONE PROPIONATE 50 MCG
50 SPRAY, SUSPENSION NASAL
Refills: 0 | Status: ACTIVE | COMMUNITY

## 2024-10-24 RX ORDER — OLOPATADINE HYDROCHLORIDE 2 MG/ML
0.2 SOLUTION OPHTHALMIC DAILY
Refills: 0 | Status: ACTIVE | COMMUNITY

## 2024-12-05 ENCOUNTER — APPOINTMENT (OUTPATIENT)
Facility: CLINIC | Age: 64
End: 2024-12-05

## 2024-12-06 ENCOUNTER — APPOINTMENT (OUTPATIENT)
Dept: INTERNAL MEDICINE | Facility: CLINIC | Age: 64
End: 2024-12-06

## 2024-12-06 ENCOUNTER — NON-APPOINTMENT (OUTPATIENT)
Age: 64
End: 2024-12-06

## 2024-12-06 VITALS — OXYGEN SATURATION: 98 % | BODY MASS INDEX: 34.69 KG/M2 | HEART RATE: 78 BPM | HEIGHT: 67 IN | WEIGHT: 221 LBS

## 2024-12-06 VITALS — SYSTOLIC BLOOD PRESSURE: 136 MMHG | DIASTOLIC BLOOD PRESSURE: 80 MMHG

## 2024-12-06 DIAGNOSIS — E78.00 PURE HYPERCHOLESTEROLEMIA, UNSPECIFIED: ICD-10-CM

## 2024-12-06 DIAGNOSIS — R10.11 RIGHT UPPER QUADRANT PAIN: ICD-10-CM

## 2024-12-06 DIAGNOSIS — Z86.39 PERSONAL HISTORY OF OTHER ENDOCRINE, NUTRITIONAL AND METABOLIC DISEASE: ICD-10-CM

## 2024-12-06 DIAGNOSIS — I45.10 UNSPECIFIED RIGHT BUNDLE-BRANCH BLOCK: ICD-10-CM

## 2024-12-06 DIAGNOSIS — R06.02 SHORTNESS OF BREATH: ICD-10-CM

## 2024-12-06 DIAGNOSIS — G47.33 OBSTRUCTIVE SLEEP APNEA (ADULT) (PEDIATRIC): ICD-10-CM

## 2024-12-06 DIAGNOSIS — Z12.39 ENCOUNTER FOR OTHER SCREENING FOR MALIGNANT NEOPLASM OF BREAST: ICD-10-CM

## 2024-12-06 DIAGNOSIS — K21.9 GASTRO-ESOPHAGEAL REFLUX DISEASE W/OUT ESOPHAGITIS: ICD-10-CM

## 2024-12-06 DIAGNOSIS — Z23 ENCOUNTER FOR IMMUNIZATION: ICD-10-CM

## 2024-12-06 DIAGNOSIS — G89.29 RIGHT UPPER QUADRANT PAIN: ICD-10-CM

## 2024-12-06 DIAGNOSIS — Z99.89 DEPENDENCE ON OTHER ENABLING MACHINES AND DEVICES: ICD-10-CM

## 2024-12-06 DIAGNOSIS — R92.30 DENSE BREASTS, UNSPECIFIED: ICD-10-CM

## 2024-12-06 DIAGNOSIS — R91.1 SOLITARY PULMONARY NODULE: ICD-10-CM

## 2024-12-06 DIAGNOSIS — Z00.00 ENCOUNTER FOR GENERAL ADULT MEDICAL EXAMINATION W/OUT ABNORMAL FINDINGS: ICD-10-CM

## 2024-12-06 PROCEDURE — 90472 IMMUNIZATION ADMIN EACH ADD: CPT

## 2024-12-06 PROCEDURE — G0403: CPT

## 2024-12-06 PROCEDURE — 99204 OFFICE O/P NEW MOD 45 MIN: CPT | Mod: 25

## 2024-12-06 PROCEDURE — 90662 IIV NO PRSV INCREASED AG IM: CPT

## 2024-12-06 PROCEDURE — 90677 PCV20 VACCINE IM: CPT

## 2024-12-06 PROCEDURE — 36415 COLL VENOUS BLD VENIPUNCTURE: CPT

## 2024-12-06 PROCEDURE — G0008: CPT

## 2024-12-06 PROCEDURE — G0402 INITIAL PREVENTIVE EXAM: CPT

## 2024-12-09 DIAGNOSIS — E66.811 OBESITY, CLASS 1: ICD-10-CM

## 2024-12-09 DIAGNOSIS — E78.5 HYPERLIPIDEMIA, UNSPECIFIED: ICD-10-CM

## 2024-12-09 LAB
ALBUMIN SERPL ELPH-MCNC: 4.4 G/DL
ALP BLD-CCNC: 100 U/L
ALT SERPL-CCNC: 11 U/L
ANION GAP SERPL CALC-SCNC: 14 MMOL/L
APPEARANCE: CLEAR
AST SERPL-CCNC: 17 U/L
BACTERIA: NEGATIVE /HPF
BASOPHILS # BLD AUTO: 0.08 K/UL
BASOPHILS NFR BLD AUTO: 1.1 %
BILIRUB SERPL-MCNC: 0.4 MG/DL
BILIRUBIN URINE: NEGATIVE
BLOOD URINE: NEGATIVE
BUN SERPL-MCNC: 14 MG/DL
CALCIUM SERPL-MCNC: 10.3 MG/DL
CAST: 0 /LPF
CHLORIDE SERPL-SCNC: 104 MMOL/L
CHOLEST SERPL-MCNC: 250 MG/DL
CO2 SERPL-SCNC: 23 MMOL/L
COLOR: YELLOW
CREAT SERPL-MCNC: 0.85 MG/DL
EGFR: 76 ML/MIN/1.73M2
EOSINOPHIL # BLD AUTO: 0.11 K/UL
EOSINOPHIL NFR BLD AUTO: 1.5 %
EPITHELIAL CELLS: 14 /HPF
ESTIMATED AVERAGE GLUCOSE: 117 MG/DL
GLUCOSE QUALITATIVE U: NEGATIVE MG/DL
GLUCOSE SERPL-MCNC: 96 MG/DL
HBA1C MFR BLD HPLC: 5.7 %
HCT VFR BLD CALC: 43.3 %
HDLC SERPL-MCNC: 72 MG/DL
HGB BLD-MCNC: 14.4 G/DL
IMM GRANULOCYTES NFR BLD AUTO: 0.1 %
KETONES URINE: NEGATIVE MG/DL
LDLC SERPL CALC-MCNC: 156 MG/DL
LEUKOCYTE ESTERASE URINE: ABNORMAL
LYMPHOCYTES # BLD AUTO: 1.98 K/UL
LYMPHOCYTES NFR BLD AUTO: 27.4 %
MAN DIFF?: NORMAL
MCHC RBC-ENTMCNC: 29.7 PG
MCHC RBC-ENTMCNC: 33.3 G/DL
MCV RBC AUTO: 89.3 FL
MICROSCOPIC-UA: NORMAL
MONOCYTES # BLD AUTO: 0.64 K/UL
MONOCYTES NFR BLD AUTO: 8.9 %
NEUTROPHILS # BLD AUTO: 4.41 K/UL
NEUTROPHILS NFR BLD AUTO: 61 %
NITRITE URINE: NEGATIVE
NONHDLC SERPL-MCNC: 178 MG/DL
PH URINE: 6.5
PLATELET # BLD AUTO: 271 K/UL
POTASSIUM SERPL-SCNC: 4 MMOL/L
PROT SERPL-MCNC: 7.4 G/DL
PROTEIN URINE: NEGATIVE MG/DL
RBC # BLD: 4.85 M/UL
RBC # FLD: 13.8 %
RED BLOOD CELLS URINE: 2 /HPF
REVIEW: NORMAL
SODIUM SERPL-SCNC: 141 MMOL/L
SPECIFIC GRAVITY URINE: 1.01
TRIGL SERPL-MCNC: 122 MG/DL
TSH SERPL-ACNC: 1.35 UIU/ML
UROBILINOGEN URINE: 0.2 MG/DL
WBC # FLD AUTO: 7.23 K/UL
WHITE BLOOD CELLS URINE: 31 /HPF

## 2024-12-09 RX ORDER — ROSUVASTATIN CALCIUM 5 MG/1
5 TABLET, FILM COATED ORAL
Qty: 90 | Refills: 1 | Status: ACTIVE | COMMUNITY
Start: 2024-12-09 | End: 1900-01-01

## 2024-12-17 ENCOUNTER — APPOINTMENT (OUTPATIENT)
Dept: ORTHOPEDIC SURGERY | Facility: CLINIC | Age: 64
End: 2024-12-17
Payer: MEDICARE

## 2024-12-17 DIAGNOSIS — Z96.652 PRESENCE OF LEFT ARTIFICIAL KNEE JOINT: ICD-10-CM

## 2024-12-17 DIAGNOSIS — M76.71 PERONEAL TENDINITIS, RIGHT LEG: ICD-10-CM

## 2024-12-17 DIAGNOSIS — Z96.651 PRESENCE OF RIGHT ARTIFICIAL KNEE JOINT: ICD-10-CM

## 2024-12-17 PROCEDURE — 73562 X-RAY EXAM OF KNEE 3: CPT | Mod: 50

## 2024-12-17 PROCEDURE — 99213 OFFICE O/P EST LOW 20 MIN: CPT

## 2024-12-17 RX ORDER — TIRZEPATIDE 2.5 MG/.5ML
2.5 INJECTION, SOLUTION SUBCUTANEOUS
Qty: 4 | Refills: 2 | Status: ACTIVE | COMMUNITY
Start: 2024-12-09

## 2024-12-19 ENCOUNTER — APPOINTMENT (OUTPATIENT)
Dept: OBGYN | Facility: CLINIC | Age: 64
End: 2024-12-19
Payer: MEDICARE

## 2024-12-19 VITALS
WEIGHT: 220 LBS | DIASTOLIC BLOOD PRESSURE: 102 MMHG | HEIGHT: 67 IN | BODY MASS INDEX: 34.53 KG/M2 | SYSTOLIC BLOOD PRESSURE: 150 MMHG

## 2024-12-19 VITALS — DIASTOLIC BLOOD PRESSURE: 95 MMHG | SYSTOLIC BLOOD PRESSURE: 167 MMHG

## 2024-12-19 DIAGNOSIS — Z01.419 ENCOUNTER FOR GYNECOLOGICAL EXAMINATION (GENERAL) (ROUTINE) W/OUT ABNORMAL FINDINGS: ICD-10-CM

## 2024-12-19 PROCEDURE — G0101: CPT

## 2024-12-23 ENCOUNTER — APPOINTMENT (OUTPATIENT)
Dept: ORTHOPEDIC SURGERY | Facility: CLINIC | Age: 64
End: 2024-12-23
Payer: MEDICARE

## 2024-12-23 VITALS — HEIGHT: 67 IN | WEIGHT: 220 LBS | BODY MASS INDEX: 34.53 KG/M2

## 2024-12-23 DIAGNOSIS — M72.2 PLANTAR FASCIAL FIBROMATOSIS: ICD-10-CM

## 2024-12-23 PROCEDURE — 99214 OFFICE O/P EST MOD 30 MIN: CPT

## 2024-12-23 PROCEDURE — 73630 X-RAY EXAM OF FOOT: CPT | Mod: RT

## 2024-12-23 PROCEDURE — 73610 X-RAY EXAM OF ANKLE: CPT | Mod: RT

## 2024-12-27 ENCOUNTER — APPOINTMENT (OUTPATIENT)
Dept: ULTRASOUND IMAGING | Facility: CLINIC | Age: 64
End: 2024-12-27
Payer: MEDICARE

## 2024-12-27 ENCOUNTER — OUTPATIENT (OUTPATIENT)
Dept: OUTPATIENT SERVICES | Facility: HOSPITAL | Age: 64
LOS: 1 days | End: 2024-12-27
Payer: MEDICARE

## 2024-12-27 ENCOUNTER — RESULT REVIEW (OUTPATIENT)
Age: 64
End: 2024-12-27

## 2024-12-27 ENCOUNTER — APPOINTMENT (OUTPATIENT)
Dept: MAMMOGRAPHY | Facility: CLINIC | Age: 64
End: 2024-12-27
Payer: MEDICARE

## 2024-12-27 DIAGNOSIS — Z90.722 ACQUIRED ABSENCE OF OVARIES, BILATERAL: Chronic | ICD-10-CM

## 2024-12-27 DIAGNOSIS — Z12.39 ENCOUNTER FOR OTHER SCREENING FOR MALIGNANT NEOPLASM OF BREAST: ICD-10-CM

## 2024-12-27 DIAGNOSIS — Z98.51 TUBAL LIGATION STATUS: Chronic | ICD-10-CM

## 2024-12-27 DIAGNOSIS — Z98.890 OTHER SPECIFIED POSTPROCEDURAL STATES: Chronic | ICD-10-CM

## 2024-12-27 DIAGNOSIS — Z96.651 PRESENCE OF RIGHT ARTIFICIAL KNEE JOINT: Chronic | ICD-10-CM

## 2024-12-27 LAB
CYTOLOGY CVX/VAG DOC THIN PREP: ABNORMAL
HPV HIGH+LOW RISK DNA PNL CVX: NOT DETECTED

## 2024-12-27 PROCEDURE — 77067 SCR MAMMO BI INCL CAD: CPT | Mod: 26

## 2024-12-27 PROCEDURE — 77063 BREAST TOMOSYNTHESIS BI: CPT | Mod: 26

## 2024-12-27 PROCEDURE — 76641 ULTRASOUND BREAST COMPLETE: CPT | Mod: 26,50,GZ

## 2024-12-27 PROCEDURE — 76641 ULTRASOUND BREAST COMPLETE: CPT

## 2024-12-27 PROCEDURE — 77067 SCR MAMMO BI INCL CAD: CPT

## 2024-12-27 PROCEDURE — 77063 BREAST TOMOSYNTHESIS BI: CPT

## 2025-01-02 ENCOUNTER — NON-APPOINTMENT (OUTPATIENT)
Age: 65
End: 2025-01-02

## 2025-01-02 ENCOUNTER — APPOINTMENT (OUTPATIENT)
Dept: CARDIOLOGY | Facility: CLINIC | Age: 65
End: 2025-01-02
Payer: MEDICARE

## 2025-01-02 VITALS
TEMPERATURE: 97.7 F | SYSTOLIC BLOOD PRESSURE: 161 MMHG | HEIGHT: 67 IN | WEIGHT: 220.38 LBS | HEART RATE: 80 BPM | BODY MASS INDEX: 34.59 KG/M2 | OXYGEN SATURATION: 98 % | DIASTOLIC BLOOD PRESSURE: 89 MMHG

## 2025-01-02 DIAGNOSIS — R06.09 OTHER FORMS OF DYSPNEA: ICD-10-CM

## 2025-01-02 DIAGNOSIS — R94.31 ABNORMAL ELECTROCARDIOGRAM [ECG] [EKG]: ICD-10-CM

## 2025-01-02 PROCEDURE — 99204 OFFICE O/P NEW MOD 45 MIN: CPT

## 2025-01-02 PROCEDURE — 93000 ELECTROCARDIOGRAM COMPLETE: CPT

## 2025-01-02 PROCEDURE — G2211 COMPLEX E/M VISIT ADD ON: CPT

## 2025-01-08 ENCOUNTER — APPOINTMENT (OUTPATIENT)
Dept: INTERNAL MEDICINE | Facility: CLINIC | Age: 65
End: 2025-01-08
Payer: MEDICARE

## 2025-01-08 VITALS — HEART RATE: 78 BPM | BODY MASS INDEX: 35 KG/M2 | OXYGEN SATURATION: 98 % | HEIGHT: 67 IN | WEIGHT: 223 LBS

## 2025-01-08 VITALS — SYSTOLIC BLOOD PRESSURE: 144 MMHG | DIASTOLIC BLOOD PRESSURE: 82 MMHG

## 2025-01-08 DIAGNOSIS — I10 ESSENTIAL (PRIMARY) HYPERTENSION: ICD-10-CM

## 2025-01-08 DIAGNOSIS — J30.9 ALLERGIC RHINITIS, UNSPECIFIED: ICD-10-CM

## 2025-01-08 DIAGNOSIS — E66.811 OBESITY, CLASS 1: ICD-10-CM

## 2025-01-08 DIAGNOSIS — E78.5 HYPERLIPIDEMIA, UNSPECIFIED: ICD-10-CM

## 2025-01-08 DIAGNOSIS — Z13.820 ENCOUNTER FOR SCREENING FOR OSTEOPOROSIS: ICD-10-CM

## 2025-01-08 PROBLEM — R20.0 BILATERAL HAND NUMBNESS: Status: ACTIVE | Noted: 2025-01-08

## 2025-01-08 PROCEDURE — 99214 OFFICE O/P EST MOD 30 MIN: CPT

## 2025-01-08 PROCEDURE — G2211 COMPLEX E/M VISIT ADD ON: CPT

## 2025-01-08 RX ORDER — OLMESARTAN MEDOXOMIL 20 MG/1
20 TABLET, FILM COATED ORAL
Qty: 90 | Refills: 0 | Status: ACTIVE | COMMUNITY
Start: 2025-01-08 | End: 1900-01-01

## 2025-01-13 ENCOUNTER — APPOINTMENT (OUTPATIENT)
Dept: RADIOLOGY | Facility: CLINIC | Age: 65
End: 2025-01-13
Payer: MEDICARE

## 2025-01-13 ENCOUNTER — APPOINTMENT (OUTPATIENT)
Dept: CT IMAGING | Facility: CLINIC | Age: 65
End: 2025-01-13
Payer: MEDICARE

## 2025-01-13 ENCOUNTER — OUTPATIENT (OUTPATIENT)
Dept: OUTPATIENT SERVICES | Facility: HOSPITAL | Age: 65
LOS: 1 days | End: 2025-01-13
Payer: MEDICARE

## 2025-01-13 DIAGNOSIS — Z98.890 OTHER SPECIFIED POSTPROCEDURAL STATES: Chronic | ICD-10-CM

## 2025-01-13 DIAGNOSIS — R20.0 ANESTHESIA OF SKIN: ICD-10-CM

## 2025-01-13 DIAGNOSIS — Z98.51 TUBAL LIGATION STATUS: Chronic | ICD-10-CM

## 2025-01-13 DIAGNOSIS — Z96.651 PRESENCE OF RIGHT ARTIFICIAL KNEE JOINT: Chronic | ICD-10-CM

## 2025-01-13 DIAGNOSIS — Z90.722 ACQUIRED ABSENCE OF OVARIES, BILATERAL: Chronic | ICD-10-CM

## 2025-01-13 PROCEDURE — 72125 CT NECK SPINE W/O DYE: CPT

## 2025-01-13 PROCEDURE — 77080 DXA BONE DENSITY AXIAL: CPT | Mod: 26

## 2025-01-13 PROCEDURE — 77080 DXA BONE DENSITY AXIAL: CPT

## 2025-01-13 PROCEDURE — 72125 CT NECK SPINE W/O DYE: CPT | Mod: 26

## 2025-01-15 DIAGNOSIS — R20.0 ANESTHESIA OF SKIN: ICD-10-CM

## 2025-01-15 DIAGNOSIS — M48.02 SPINAL STENOSIS, CERVICAL REGION: ICD-10-CM

## 2025-01-22 ENCOUNTER — APPOINTMENT (OUTPATIENT)
Dept: CARDIOLOGY | Facility: CLINIC | Age: 65
End: 2025-01-22
Payer: MEDICARE

## 2025-01-22 PROCEDURE — 93880 EXTRACRANIAL BILAT STUDY: CPT

## 2025-01-22 PROCEDURE — 93306 TTE W/DOPPLER COMPLETE: CPT

## 2025-01-24 ENCOUNTER — APPOINTMENT (OUTPATIENT)
Dept: INTERNAL MEDICINE | Facility: CLINIC | Age: 65
End: 2025-01-24
Payer: MEDICARE

## 2025-01-24 VITALS — OXYGEN SATURATION: 96 % | BODY MASS INDEX: 35 KG/M2 | HEIGHT: 67 IN | HEART RATE: 74 BPM | WEIGHT: 223 LBS

## 2025-01-24 VITALS — SYSTOLIC BLOOD PRESSURE: 128 MMHG | DIASTOLIC BLOOD PRESSURE: 80 MMHG

## 2025-01-24 DIAGNOSIS — I10 ESSENTIAL (PRIMARY) HYPERTENSION: ICD-10-CM

## 2025-01-24 DIAGNOSIS — E78.5 HYPERLIPIDEMIA, UNSPECIFIED: ICD-10-CM

## 2025-01-24 PROCEDURE — 99213 OFFICE O/P EST LOW 20 MIN: CPT

## 2025-01-24 PROCEDURE — 36415 COLL VENOUS BLD VENIPUNCTURE: CPT

## 2025-01-24 PROCEDURE — G2211 COMPLEX E/M VISIT ADD ON: CPT

## 2025-01-25 LAB
ALBUMIN SERPL ELPH-MCNC: 4.4 G/DL
ALP BLD-CCNC: 96 U/L
ALT SERPL-CCNC: 18 U/L
ANION GAP SERPL CALC-SCNC: 11 MMOL/L
AST SERPL-CCNC: 18 U/L
BASOPHILS # BLD AUTO: 0.03 K/UL
BASOPHILS NFR BLD AUTO: 0.5 %
BILIRUB SERPL-MCNC: 0.4 MG/DL
BUN SERPL-MCNC: 16 MG/DL
CALCIUM SERPL-MCNC: 10 MG/DL
CHLORIDE SERPL-SCNC: 104 MMOL/L
CHOLEST SERPL-MCNC: 174 MG/DL
CO2 SERPL-SCNC: 24 MMOL/L
CREAT SERPL-MCNC: 0.93 MG/DL
EGFR: 68 ML/MIN/1.73M2
EOSINOPHIL # BLD AUTO: 0.14 K/UL
EOSINOPHIL NFR BLD AUTO: 2.4 %
GLUCOSE SERPL-MCNC: 112 MG/DL
HCT VFR BLD CALC: 43.4 %
HDLC SERPL-MCNC: 75 MG/DL
HGB BLD-MCNC: 14.3 G/DL
IMM GRANULOCYTES NFR BLD AUTO: 0.3 %
LDLC SERPL CALC-MCNC: 78 MG/DL
LYMPHOCYTES # BLD AUTO: 1.54 K/UL
LYMPHOCYTES NFR BLD AUTO: 26.1 %
MAN DIFF?: NORMAL
MCHC RBC-ENTMCNC: 29.8 PG
MCHC RBC-ENTMCNC: 32.9 G/DL
MCV RBC AUTO: 90.4 FL
MONOCYTES # BLD AUTO: 0.54 K/UL
MONOCYTES NFR BLD AUTO: 9.1 %
NEUTROPHILS # BLD AUTO: 3.64 K/UL
NEUTROPHILS NFR BLD AUTO: 61.6 %
NONHDLC SERPL-MCNC: 99 MG/DL
PLATELET # BLD AUTO: 262 K/UL
POTASSIUM SERPL-SCNC: 4.6 MMOL/L
PROT SERPL-MCNC: 7.5 G/DL
RBC # BLD: 4.8 M/UL
RBC # FLD: 14.4 %
SODIUM SERPL-SCNC: 139 MMOL/L
TRIGL SERPL-MCNC: 121 MG/DL
WBC # FLD AUTO: 5.91 K/UL

## 2025-02-03 ENCOUNTER — APPOINTMENT (OUTPATIENT)
Dept: NEUROSURGERY | Facility: CLINIC | Age: 65
End: 2025-02-03
Payer: MEDICARE

## 2025-02-03 VITALS
BODY MASS INDEX: 34.53 KG/M2 | DIASTOLIC BLOOD PRESSURE: 84 MMHG | HEIGHT: 67 IN | WEIGHT: 220 LBS | OXYGEN SATURATION: 98 % | SYSTOLIC BLOOD PRESSURE: 133 MMHG | HEART RATE: 88 BPM

## 2025-02-03 DIAGNOSIS — M47.812 SPONDYLOSIS W/OUT MYELOPATHY OR RADICULOPATHY, CERVICAL REGION: ICD-10-CM

## 2025-02-03 DIAGNOSIS — R20.0 ANESTHESIA OF SKIN: ICD-10-CM

## 2025-02-03 DIAGNOSIS — M54.42 LUMBAGO WITH SCIATICA, LEFT SIDE: ICD-10-CM

## 2025-02-03 DIAGNOSIS — M47.27 OTHER SPONDYLOSIS WITH RADICULOPATHY, LUMBOSACRAL REGION: ICD-10-CM

## 2025-02-03 DIAGNOSIS — M48.02 SPINAL STENOSIS, CERVICAL REGION: ICD-10-CM

## 2025-02-03 DIAGNOSIS — M47.22 OTHER SPONDYLOSIS WITH RADICULOPATHY, CERVICAL REGION: ICD-10-CM

## 2025-02-03 DIAGNOSIS — M54.41 LUMBAGO WITH SCIATICA, LEFT SIDE: ICD-10-CM

## 2025-02-03 PROCEDURE — 99203 OFFICE O/P NEW LOW 30 MIN: CPT

## 2025-02-06 PROBLEM — M54.42 ACUTE BILATERAL LOW BACK PAIN WITH BILATERAL SCIATICA: Status: ACTIVE | Noted: 2025-02-06

## 2025-02-06 PROBLEM — M47.812 CERVICAL SPONDYLOSIS: Status: ACTIVE | Noted: 2025-02-06

## 2025-02-06 PROBLEM — M47.22 OSTEOARTHRITIS OF SPINE WITH RADICULOPATHY, CERVICAL REGION: Status: ACTIVE | Noted: 2025-02-06

## 2025-02-06 PROBLEM — M47.27 OSTEOARTHRITIS OF SPINE WITH RADICULOPATHY, LUMBOSACRAL REGION: Status: ACTIVE | Noted: 2025-02-06

## 2025-02-07 DIAGNOSIS — R20.2 ANESTHESIA OF SKIN: ICD-10-CM

## 2025-02-07 DIAGNOSIS — R20.0 ANESTHESIA OF SKIN: ICD-10-CM

## 2025-02-10 ENCOUNTER — APPOINTMENT (OUTPATIENT)
Dept: MRI IMAGING | Facility: CLINIC | Age: 65
End: 2025-02-10
Payer: MEDICARE

## 2025-02-10 PROCEDURE — 72141 MRI NECK SPINE W/O DYE: CPT

## 2025-02-10 PROCEDURE — 72148 MRI LUMBAR SPINE W/O DYE: CPT

## 2025-02-12 ENCOUNTER — APPOINTMENT (OUTPATIENT)
Dept: CT IMAGING | Facility: CLINIC | Age: 65
End: 2025-02-12

## 2025-02-24 ENCOUNTER — APPOINTMENT (OUTPATIENT)
Dept: CARDIOLOGY | Facility: CLINIC | Age: 65
End: 2025-02-24

## 2025-03-04 ENCOUNTER — APPOINTMENT (OUTPATIENT)
Dept: NEUROSURGERY | Facility: CLINIC | Age: 65
End: 2025-03-04
Payer: MEDICARE

## 2025-03-04 VITALS
HEIGHT: 67 IN | SYSTOLIC BLOOD PRESSURE: 122 MMHG | WEIGHT: 217 LBS | DIASTOLIC BLOOD PRESSURE: 74 MMHG | BODY MASS INDEX: 34.06 KG/M2 | OXYGEN SATURATION: 97 % | HEART RATE: 73 BPM

## 2025-03-04 DIAGNOSIS — M54.42 LUMBAGO WITH SCIATICA, LEFT SIDE: ICD-10-CM

## 2025-03-04 DIAGNOSIS — M47.27 OTHER SPONDYLOSIS WITH RADICULOPATHY, LUMBOSACRAL REGION: ICD-10-CM

## 2025-03-04 DIAGNOSIS — M76.32 ILIOTIBIAL BAND SYNDROME, LEFT LEG: ICD-10-CM

## 2025-03-04 DIAGNOSIS — M72.2 PLANTAR FASCIAL FIBROMATOSIS: ICD-10-CM

## 2025-03-04 DIAGNOSIS — G95.0 SYRINGOMYELIA AND SYRINGOBULBIA: ICD-10-CM

## 2025-03-04 DIAGNOSIS — M54.41 LUMBAGO WITH SCIATICA, LEFT SIDE: ICD-10-CM

## 2025-03-04 DIAGNOSIS — M47.812 SPONDYLOSIS W/OUT MYELOPATHY OR RADICULOPATHY, CERVICAL REGION: ICD-10-CM

## 2025-03-04 DIAGNOSIS — M47.22 OTHER SPONDYLOSIS WITH RADICULOPATHY, CERVICAL REGION: ICD-10-CM

## 2025-03-04 PROCEDURE — 99214 OFFICE O/P EST MOD 30 MIN: CPT

## 2025-03-05 ENCOUNTER — APPOINTMENT (OUTPATIENT)
Dept: CT IMAGING | Facility: CLINIC | Age: 65
End: 2025-03-05
Payer: MEDICARE

## 2025-03-05 PROBLEM — M76.32 ILIOTIBIAL BAND SYNDROME OF LEFT SIDE: Status: ACTIVE | Noted: 2025-03-05

## 2025-03-05 PROBLEM — G95.0 SYRINX OF SPINAL CORD: Status: ACTIVE | Noted: 2025-03-05

## 2025-03-05 PROCEDURE — 75574 CT ANGIO HRT W/3D IMAGE: CPT

## 2025-03-06 ENCOUNTER — RESULT REVIEW (OUTPATIENT)
Age: 65
End: 2025-03-06

## 2025-03-06 PROCEDURE — 75580 N-INVAS EST C FFR SW ALY CTA: CPT

## 2025-03-20 DIAGNOSIS — I25.10 ATHEROSCLEROTIC HEART DISEASE OF NATIVE CORONARY ARTERY W/OUT ANGINA PECTORIS: ICD-10-CM

## 2025-03-31 ENCOUNTER — APPOINTMENT (OUTPATIENT)
Dept: CARDIOLOGY | Facility: CLINIC | Age: 65
End: 2025-03-31
Payer: MEDICARE

## 2025-03-31 VITALS
OXYGEN SATURATION: 97 % | DIASTOLIC BLOOD PRESSURE: 76 MMHG | SYSTOLIC BLOOD PRESSURE: 118 MMHG | TEMPERATURE: 97.8 F | HEIGHT: 67 IN | BODY MASS INDEX: 32.8 KG/M2 | HEART RATE: 83 BPM | WEIGHT: 209 LBS | RESPIRATION RATE: 16 BRPM

## 2025-03-31 DIAGNOSIS — R06.09 OTHER FORMS OF DYSPNEA: ICD-10-CM

## 2025-03-31 PROCEDURE — G2211 COMPLEX E/M VISIT ADD ON: CPT

## 2025-03-31 PROCEDURE — 99215 OFFICE O/P EST HI 40 MIN: CPT

## 2025-04-02 ENCOUNTER — APPOINTMENT (OUTPATIENT)
Dept: INTERNAL MEDICINE | Facility: CLINIC | Age: 65
End: 2025-04-02
Payer: MEDICARE

## 2025-04-02 VITALS — DIASTOLIC BLOOD PRESSURE: 76 MMHG | SYSTOLIC BLOOD PRESSURE: 122 MMHG

## 2025-04-02 VITALS — HEIGHT: 67 IN | HEART RATE: 90 BPM | BODY MASS INDEX: 34.69 KG/M2 | OXYGEN SATURATION: 97 % | WEIGHT: 221 LBS

## 2025-04-02 DIAGNOSIS — Z71.3 DIETARY COUNSELING AND SURVEILLANCE: ICD-10-CM

## 2025-04-02 DIAGNOSIS — E66.811 OBESITY, CLASS 1: ICD-10-CM

## 2025-04-02 DIAGNOSIS — I25.10 ATHEROSCLEROTIC HEART DISEASE OF NATIVE CORONARY ARTERY W/OUT ANGINA PECTORIS: ICD-10-CM

## 2025-04-02 DIAGNOSIS — E78.5 HYPERLIPIDEMIA, UNSPECIFIED: ICD-10-CM

## 2025-04-02 DIAGNOSIS — I10 ESSENTIAL (PRIMARY) HYPERTENSION: ICD-10-CM

## 2025-04-02 PROCEDURE — 36415 COLL VENOUS BLD VENIPUNCTURE: CPT

## 2025-04-02 PROCEDURE — 99213 OFFICE O/P EST LOW 20 MIN: CPT

## 2025-04-02 PROCEDURE — G2211 COMPLEX E/M VISIT ADD ON: CPT

## 2025-04-02 PROCEDURE — G0447 BEHAVIOR COUNSEL OBESITY 15M: CPT

## 2025-04-02 RX ORDER — SEMAGLUTIDE 0.25 MG/.5ML
0.25 INJECTION, SOLUTION SUBCUTANEOUS
Qty: 1 | Refills: 2 | Status: ACTIVE | COMMUNITY
Start: 2025-04-02 | End: 1900-01-01

## 2025-04-03 ENCOUNTER — OUTPATIENT (OUTPATIENT)
Dept: OUTPATIENT SERVICES | Facility: HOSPITAL | Age: 65
LOS: 1 days | End: 2025-04-03
Payer: MEDICARE

## 2025-04-03 VITALS
OXYGEN SATURATION: 97 % | TEMPERATURE: 97 F | HEIGHT: 67 IN | SYSTOLIC BLOOD PRESSURE: 132 MMHG | DIASTOLIC BLOOD PRESSURE: 71 MMHG | RESPIRATION RATE: 19 BRPM | HEART RATE: 72 BPM | WEIGHT: 216.93 LBS

## 2025-04-03 VITALS
SYSTOLIC BLOOD PRESSURE: 124 MMHG | HEART RATE: 80 BPM | RESPIRATION RATE: 18 BRPM | OXYGEN SATURATION: 98 % | DIASTOLIC BLOOD PRESSURE: 74 MMHG

## 2025-04-03 DIAGNOSIS — Z98.890 OTHER SPECIFIED POSTPROCEDURAL STATES: Chronic | ICD-10-CM

## 2025-04-03 DIAGNOSIS — Z98.51 TUBAL LIGATION STATUS: Chronic | ICD-10-CM

## 2025-04-03 DIAGNOSIS — I25.10 ATHEROSCLEROTIC HEART DISEASE OF NATIVE CORONARY ARTERY WITHOUT ANGINA PECTORIS: ICD-10-CM

## 2025-04-03 DIAGNOSIS — Z90.722 ACQUIRED ABSENCE OF OVARIES, BILATERAL: Chronic | ICD-10-CM

## 2025-04-03 DIAGNOSIS — Z96.651 PRESENCE OF RIGHT ARTIFICIAL KNEE JOINT: Chronic | ICD-10-CM

## 2025-04-03 LAB
ALBUMIN SERPL ELPH-MCNC: 4.3 G/DL
ALP BLD-CCNC: 91 U/L
ALT SERPL-CCNC: 17 U/L
ANION GAP SERPL CALC-SCNC: 11 MMOL/L
ANION GAP SERPL CALC-SCNC: 13 MMOL/L — SIGNIFICANT CHANGE UP (ref 5–17)
AST SERPL-CCNC: 18 U/L
BASOPHILS # BLD AUTO: 0.07 K/UL
BASOPHILS NFR BLD AUTO: 1 %
BILIRUB SERPL-MCNC: 0.4 MG/DL
BUN SERPL-MCNC: 15 MG/DL — SIGNIFICANT CHANGE UP (ref 7–23)
BUN SERPL-MCNC: 16 MG/DL
CALCIUM SERPL-MCNC: 10.4 MG/DL
CALCIUM SERPL-MCNC: 9.6 MG/DL — SIGNIFICANT CHANGE UP (ref 8.4–10.5)
CHLORIDE SERPL-SCNC: 104 MMOL/L
CHLORIDE SERPL-SCNC: 104 MMOL/L — SIGNIFICANT CHANGE UP (ref 96–108)
CHOLEST SERPL-MCNC: 180 MG/DL
CO2 SERPL-SCNC: 21 MMOL/L — LOW (ref 22–31)
CO2 SERPL-SCNC: 25 MMOL/L
CREAT SERPL-MCNC: 0.87 MG/DL
CREAT SERPL-MCNC: 0.89 MG/DL — SIGNIFICANT CHANGE UP (ref 0.5–1.3)
EGFR: 71 ML/MIN/1.73M2 — SIGNIFICANT CHANGE UP
EGFR: 71 ML/MIN/1.73M2 — SIGNIFICANT CHANGE UP
EGFRCR SERPLBLD CKD-EPI 2021: 73 ML/MIN/1.73M2
EOSINOPHIL # BLD AUTO: 0.22 K/UL
EOSINOPHIL NFR BLD AUTO: 3 %
ESTIMATED AVERAGE GLUCOSE: 123 MG/DL
GLUCOSE SERPL-MCNC: 104 MG/DL — HIGH (ref 70–99)
GLUCOSE SERPL-MCNC: 85 MG/DL
HBA1C MFR BLD HPLC: 5.9 %
HCT VFR BLD CALC: 40 % — SIGNIFICANT CHANGE UP (ref 34.5–45)
HCT VFR BLD CALC: 42.3 %
HDLC SERPL-MCNC: 69 MG/DL
HGB BLD-MCNC: 13.4 G/DL — SIGNIFICANT CHANGE UP (ref 11.5–15.5)
HGB BLD-MCNC: 13.7 G/DL
IMM GRANULOCYTES NFR BLD AUTO: 0.3 %
LDLC SERPL-MCNC: 90 MG/DL
LYMPHOCYTES # BLD AUTO: 2.11 K/UL
LYMPHOCYTES NFR BLD AUTO: 28.8 %
MAN DIFF?: NORMAL
MCHC RBC-ENTMCNC: 29.8 PG
MCHC RBC-ENTMCNC: 30.1 PG — SIGNIFICANT CHANGE UP (ref 27–34)
MCHC RBC-ENTMCNC: 32.4 G/DL
MCHC RBC-ENTMCNC: 33.5 G/DL — SIGNIFICANT CHANGE UP (ref 32–36)
MCV RBC AUTO: 89.9 FL — SIGNIFICANT CHANGE UP (ref 80–100)
MCV RBC AUTO: 92.2 FL
MONOCYTES # BLD AUTO: 0.77 K/UL
MONOCYTES NFR BLD AUTO: 10.5 %
NEUTROPHILS # BLD AUTO: 4.14 K/UL
NEUTROPHILS NFR BLD AUTO: 56.4 %
NONHDLC SERPL-MCNC: 110 MG/DL
NRBC BLD AUTO-RTO: 0 /100 WBCS — SIGNIFICANT CHANGE UP (ref 0–0)
PLATELET # BLD AUTO: 232 K/UL — SIGNIFICANT CHANGE UP (ref 150–400)
PLATELET # BLD AUTO: 262 K/UL
POTASSIUM SERPL-MCNC: 3.9 MMOL/L — SIGNIFICANT CHANGE UP (ref 3.5–5.3)
POTASSIUM SERPL-SCNC: 3.9 MMOL/L — SIGNIFICANT CHANGE UP (ref 3.5–5.3)
POTASSIUM SERPL-SCNC: 4.6 MMOL/L
PROT SERPL-MCNC: 7.3 G/DL
RBC # BLD: 4.45 M/UL — SIGNIFICANT CHANGE UP (ref 3.8–5.2)
RBC # BLD: 4.59 M/UL
RBC # FLD: 14.4 % — SIGNIFICANT CHANGE UP (ref 10.3–14.5)
RBC # FLD: 15.1 %
SODIUM SERPL-SCNC: 138 MMOL/L — SIGNIFICANT CHANGE UP (ref 135–145)
SODIUM SERPL-SCNC: 141 MMOL/L
TRIGL SERPL-MCNC: 116 MG/DL
WBC # BLD: 6.63 K/UL — SIGNIFICANT CHANGE UP (ref 3.8–10.5)
WBC # FLD AUTO: 6.63 K/UL — SIGNIFICANT CHANGE UP (ref 3.8–10.5)
WBC # FLD AUTO: 7.33 K/UL

## 2025-04-03 PROCEDURE — C1769: CPT

## 2025-04-03 PROCEDURE — 80048 BASIC METABOLIC PNL TOTAL CA: CPT

## 2025-04-03 PROCEDURE — C1887: CPT

## 2025-04-03 PROCEDURE — 36415 COLL VENOUS BLD VENIPUNCTURE: CPT

## 2025-04-03 PROCEDURE — 85027 COMPLETE CBC AUTOMATED: CPT

## 2025-04-03 PROCEDURE — 99152 MOD SED SAME PHYS/QHP 5/>YRS: CPT

## 2025-04-03 PROCEDURE — 93458 L HRT ARTERY/VENTRICLE ANGIO: CPT

## 2025-04-03 PROCEDURE — C1894: CPT

## 2025-04-03 PROCEDURE — 93010 ELECTROCARDIOGRAM REPORT: CPT

## 2025-04-03 PROCEDURE — 93005 ELECTROCARDIOGRAM TRACING: CPT

## 2025-04-03 PROCEDURE — 93458 L HRT ARTERY/VENTRICLE ANGIO: CPT | Mod: 26

## 2025-04-03 RX ORDER — OLOPATADINE HYDROCHLORIDE 1 MG/ML
1 SOLUTION OPHTHALMIC
Refills: 0 | DISCHARGE

## 2025-04-03 RX ORDER — OLMESARTAN MEDOXOMIL 5 MG/1
1 TABLET, FILM COATED ORAL
Refills: 0 | DISCHARGE

## 2025-04-03 RX ORDER — ROSUVASTATIN CALCIUM 5 MG/1
1 TABLET, FILM COATED ORAL
Refills: 0 | DISCHARGE

## 2025-04-03 RX ORDER — ASPIRIN 325 MG
1 TABLET ORAL
Qty: 0 | Refills: 0 | DISCHARGE

## 2025-04-03 RX ADMIN — Medication 75 MILLILITER(S): at 08:01

## 2025-04-03 RX ADMIN — Medication 750 MILLILITER(S): at 08:01

## 2025-04-03 NOTE — H&P CARDIOLOGY - NSICDXPASTMEDICALHX_GEN_ALL_CORE_FT
PAST MEDICAL HISTORY:  CAD (coronary artery disease)     GERD (gastroesophageal reflux disease)     Knee osteoarthritis left    Sleep apnea     Umbilical hernia without obstruction and without gangrene     Unilateral primary osteoarthritis, right knee

## 2025-04-03 NOTE — ASU DISCHARGE PLAN (ADULT/PEDIATRIC) - ASU DC SPECIAL INSTRUCTIONSFT
Please see preprinted discharge instruction sheet    Please discuss medical therapy with Dr. Rojas which would include taking Aspirin 81mg every day and increasing your Rosuvastatin therapy as tolerated.

## 2025-04-03 NOTE — ASU DISCHARGE PLAN (ADULT/PEDIATRIC) - FINANCIAL ASSISTANCE
[facility] provides services at a reduced cost to those who are determined to be eligible through [facility]’s financial assistance program. Information regarding [facility]’s financial assistance program can be found by going to [email or web address] or by calling [phone].

## 2025-04-03 NOTE — H&P CARDIOLOGY - HISTORY OF PRESENT ILLNESS
66 y/o female with pmh of HTN,, CAD by imaging, GERD, VIVI on CPAP and OA s/p left knee replacement recently under went her routine mammography screening was noted to have coronary calcifications referred to Dr. Scott Rojas, Cardiologist and was sent for CCTA.  Her results revealed CAD in the LAD, LCX and RCA with high likelihood of significant CAD in the LAD and lower likelihood in the LCX and RCA (results below).  Her TTE revealed LVEF 60-65% with normal findings and carotid dopplers with no significant stenosis (results below. She presents for [facility] to see if she has obstructive CAD.  She denies cp or palpitations but does report having sob when walking up a long staircase.       CCTA-FFR 3/6/2025  IMPRESSION:    1.  Abnormal FFR-CT assessment of the analyzable segments of the LAD indicating a high likelihood of hemodynamically significant coronary artery disease.  2.  Normal FFR-CT assessment of the analyzable segments of the LCX and RCA, indicating a Low likelihood of hemodynamically significant coronary artery disease.    CCTA 3/5/2025  Impression:    1.  Possible obstructive CAD with mixed (calcified/non-calcified) plaque resulting in at least moderate stenosis (>50%) of the LAD and mild stenosis (25-49%) of the small D3.  2.  Mild coronary artery calcification (Agatston score 63 AU).  3.  This study will be sent for CT FFR for further hemodynamic assessment of plaque-stenosis and results will be reported separately.      TTE 1/22/2025  CONCLUSIONS:    1.Left ventricular cavity is normal in size. Left ventricular systolic function is normal with an ejection fraction visually estimated at 65 to 70 %.  2.Normal left ventricular diastolic function.  3.Normal right ventricular cavity size, with normal wall thickness, and normal right ventricular systolic function.  4.Left atrium is normal in size.  5.Trileaflet aortic valve with normal systolic excursion. There is mild calcification of the aortic valve leaflets. There is minimal thickening of the aortic valve leaflets.  6.No prior echocardiogram is available for comparison.    Carotid Duplex bilateral 1/23/2025  CONCLUSIONS:    1.Right: proximal ICA no stenosis.  2.Left: proximal ICA 1-19% stenosis.  3.Vertebral arteries: antegrade flow bilaterally.  4.No prior exam is available for comparison.

## 2025-04-03 NOTE — ASU DISCHARGE PLAN (ADULT/PEDIATRIC) - CARE PROVIDER_API CALL
Soctt Rojas  Cardiovascular Disease  [address] [place] [address]  Phone: [phone]  Fax: [phone]  Established Patient  Follow Up Time: 1 month

## 2025-04-03 NOTE — ASU DISCHARGE PLAN (ADULT/PEDIATRIC) - NS MD DC FALL RISK RISK
For information on Fall & Injury Prevention, visit: [email or web address] OR  [email or web address] OR  https://www.cdc.gov/steadi/patient.html

## 2025-07-09 ENCOUNTER — NON-APPOINTMENT (OUTPATIENT)
Age: 65
End: 2025-07-09

## 2025-07-14 ENCOUNTER — APPOINTMENT (OUTPATIENT)
Dept: CARDIOLOGY | Facility: CLINIC | Age: 65
End: 2025-07-14
Payer: MEDICARE

## 2025-07-14 VITALS
OXYGEN SATURATION: 97 % | TEMPERATURE: 97.7 F | DIASTOLIC BLOOD PRESSURE: 77 MMHG | HEART RATE: 79 BPM | BODY MASS INDEX: 34.37 KG/M2 | WEIGHT: 219 LBS | SYSTOLIC BLOOD PRESSURE: 129 MMHG | HEIGHT: 67 IN | RESPIRATION RATE: 15 BRPM

## 2025-07-14 PROCEDURE — G2211 COMPLEX E/M VISIT ADD ON: CPT

## 2025-07-14 PROCEDURE — 93000 ELECTROCARDIOGRAM COMPLETE: CPT

## 2025-07-14 PROCEDURE — 99215 OFFICE O/P EST HI 40 MIN: CPT

## 2025-07-14 RX ORDER — METOPROLOL SUCCINATE 25 MG/1
25 TABLET, EXTENDED RELEASE ORAL DAILY
Qty: 90 | Refills: 1 | Status: ACTIVE | COMMUNITY
Start: 2025-07-14 | End: 1900-01-01

## 2025-08-21 ENCOUNTER — OUTPATIENT (OUTPATIENT)
Dept: OUTPATIENT SERVICES | Facility: HOSPITAL | Age: 65
LOS: 1 days | End: 2025-08-21
Payer: MEDICARE

## 2025-08-21 ENCOUNTER — APPOINTMENT (OUTPATIENT)
Dept: CT IMAGING | Facility: CLINIC | Age: 65
End: 2025-08-21
Payer: MEDICARE

## 2025-08-21 ENCOUNTER — NON-APPOINTMENT (OUTPATIENT)
Age: 65
End: 2025-08-21

## 2025-08-21 ENCOUNTER — APPOINTMENT (OUTPATIENT)
Dept: SURGERY | Facility: CLINIC | Age: 65
End: 2025-08-21
Payer: MEDICARE

## 2025-08-21 VITALS
HEIGHT: 67 IN | SYSTOLIC BLOOD PRESSURE: 136 MMHG | HEART RATE: 71 BPM | BODY MASS INDEX: 34.37 KG/M2 | WEIGHT: 219 LBS | TEMPERATURE: 97.4 F | OXYGEN SATURATION: 98 % | DIASTOLIC BLOOD PRESSURE: 97 MMHG

## 2025-08-21 DIAGNOSIS — Z98.51 TUBAL LIGATION STATUS: Chronic | ICD-10-CM

## 2025-08-21 DIAGNOSIS — Z98.890 OTHER SPECIFIED POSTPROCEDURAL STATES: Chronic | ICD-10-CM

## 2025-08-21 DIAGNOSIS — Z90.722 ACQUIRED ABSENCE OF OVARIES, BILATERAL: Chronic | ICD-10-CM

## 2025-08-21 DIAGNOSIS — K43.2 INCISIONAL HERNIA WITHOUT OBSTRUCTION OR GANGRENE: ICD-10-CM

## 2025-08-21 DIAGNOSIS — Z96.651 PRESENCE OF RIGHT ARTIFICIAL KNEE JOINT: Chronic | ICD-10-CM

## 2025-08-21 DIAGNOSIS — K43.2 INCISIONAL HERNIA W/OUT OBSTRUCTION OR GANGRENE: ICD-10-CM

## 2025-08-21 PROCEDURE — 99204 OFFICE O/P NEW MOD 45 MIN: CPT

## 2025-08-21 PROCEDURE — 74176 CT ABD & PELVIS W/O CONTRAST: CPT

## 2025-08-21 PROCEDURE — 74176 CT ABD & PELVIS W/O CONTRAST: CPT | Mod: 26

## 2025-08-28 ENCOUNTER — NON-APPOINTMENT (OUTPATIENT)
Age: 65
End: 2025-08-28

## 2025-09-15 ENCOUNTER — RX RENEWAL (OUTPATIENT)
Age: 65
End: 2025-09-15

## 2025-09-18 ENCOUNTER — APPOINTMENT (OUTPATIENT)
Dept: ORTHOPEDIC SURGERY | Facility: CLINIC | Age: 65
End: 2025-09-18
Payer: MEDICARE

## 2025-09-18 VITALS — HEIGHT: 67 IN | BODY MASS INDEX: 34.37 KG/M2 | WEIGHT: 219 LBS

## 2025-09-18 DIAGNOSIS — M54.16 RADICULOPATHY, LUMBAR REGION: ICD-10-CM

## 2025-09-18 PROCEDURE — 99204 OFFICE O/P NEW MOD 45 MIN: CPT

## 2025-09-18 PROCEDURE — 73590 X-RAY EXAM OF LOWER LEG: CPT | Mod: RT

## 2025-09-18 RX ORDER — TIZANIDINE 2 MG/1
2 TABLET ORAL
Qty: 30 | Refills: 0 | Status: ACTIVE | COMMUNITY
Start: 2025-09-18 | End: 1900-01-01

## 2025-09-18 RX ORDER — METHYLPREDNISOLONE 4 MG/1
4 TABLET ORAL
Qty: 1 | Refills: 0 | Status: ACTIVE | COMMUNITY
Start: 2025-09-18 | End: 1900-01-01